# Patient Record
Sex: FEMALE | Race: WHITE | NOT HISPANIC OR LATINO | Employment: FULL TIME | URBAN - METROPOLITAN AREA
[De-identification: names, ages, dates, MRNs, and addresses within clinical notes are randomized per-mention and may not be internally consistent; named-entity substitution may affect disease eponyms.]

---

## 2023-03-07 LAB — THYROTROPIN (MIU/L) IN SER/PLAS BY DETECTION LIMIT <= 0.05 MIU/L: 0.16 MIU/L (ref 0.44–3.98)

## 2023-03-09 PROBLEM — D22.9 MULTIPLE NEVI: Status: ACTIVE | Noted: 2023-03-09

## 2023-03-09 PROBLEM — N89.8 VAGINAL IRRITATION: Status: ACTIVE | Noted: 2023-03-09

## 2023-03-09 PROBLEM — M25.562 LEFT KNEE PAIN: Status: ACTIVE | Noted: 2023-03-09

## 2023-03-09 PROBLEM — E03.9 HYPOTHYROIDISM: Status: ACTIVE | Noted: 2023-03-09

## 2023-03-09 PROBLEM — B37.31 YEAST VAGINITIS: Status: ACTIVE | Noted: 2023-03-09

## 2023-03-09 PROBLEM — J06.9 URI WITH COUGH AND CONGESTION: Status: ACTIVE | Noted: 2023-03-09

## 2023-03-09 PROBLEM — K90.0 CELIAC DISEASE (HHS-HCC): Status: ACTIVE | Noted: 2023-03-09

## 2023-03-09 PROBLEM — K58.0 IRRITABLE BOWEL SYNDROME WITH DIARRHEA: Status: ACTIVE | Noted: 2023-03-09

## 2023-03-09 PROBLEM — L98.9 FACIAL SKIN LESION: Status: ACTIVE | Noted: 2023-03-09

## 2023-03-09 PROBLEM — J02.9 SORE THROAT: Status: ACTIVE | Noted: 2023-03-09

## 2023-03-09 PROBLEM — R05.9 COUGH: Status: ACTIVE | Noted: 2023-03-09

## 2023-03-09 PROBLEM — R39.15 URINARY URGENCY: Status: ACTIVE | Noted: 2023-03-09

## 2023-03-09 PROBLEM — J02.9 ACUTE PHARYNGITIS: Status: ACTIVE | Noted: 2023-03-09

## 2023-03-09 PROBLEM — R50.9 FEVER: Status: ACTIVE | Noted: 2023-03-09

## 2023-03-09 PROBLEM — N39.0 ACUTE UTI: Status: ACTIVE | Noted: 2023-03-09

## 2023-03-09 PROBLEM — O02.1 MISSED ABORTION (HHS-HCC): Status: ACTIVE | Noted: 2023-03-09

## 2023-03-09 PROBLEM — R87.610 PAP SMEAR ABNORMALITY OF CERVIX WITH ASCUS FAVORING BENIGN: Status: ACTIVE | Noted: 2023-03-09

## 2023-03-09 PROBLEM — J01.90 ACUTE SINUSITIS: Status: ACTIVE | Noted: 2023-03-09

## 2023-03-09 PROBLEM — F98.8 ADD (ATTENTION DEFICIT DISORDER): Status: ACTIVE | Noted: 2023-03-09

## 2023-03-09 PROBLEM — Z20.822 SUSPECTED COVID-19 VIRUS INFECTION: Status: ACTIVE | Noted: 2023-03-09

## 2023-03-09 PROBLEM — M25.862: Status: ACTIVE | Noted: 2023-03-09

## 2023-03-09 RX ORDER — LEVOTHYROXINE SODIUM 112 UG/1
TABLET ORAL
COMMUNITY
Start: 2022-01-24 | End: 2023-03-22

## 2023-03-12 ASSESSMENT — PROMIS GLOBAL HEALTH SCALE
RATE_GENERAL_HEALTH: VERY GOOD
RATE_AVERAGE_PAIN: 0
RATE_SOCIAL_SATISFACTION: EXCELLENT
EMOTIONAL_PROBLEMS: RARELY
RATE_MENTAL_HEALTH: VERY GOOD
CARRYOUT_SOCIAL_ACTIVITIES: EXCELLENT
RATE_PHYSICAL_HEALTH: EXCELLENT
CARRYOUT_PHYSICAL_ACTIVITIES: COMPLETELY
RATE_AVERAGE_FATIGUE: MILD
RATE_QUALITY_OF_LIFE: VERY GOOD

## 2023-03-13 ENCOUNTER — OFFICE VISIT (OUTPATIENT)
Dept: PRIMARY CARE | Facility: CLINIC | Age: 38
End: 2023-03-13
Payer: COMMERCIAL

## 2023-03-13 ENCOUNTER — APPOINTMENT (OUTPATIENT)
Dept: PRIMARY CARE | Facility: CLINIC | Age: 38
End: 2023-03-13
Payer: COMMERCIAL

## 2023-03-13 VITALS
WEIGHT: 117.1 LBS | HEART RATE: 87 BPM | OXYGEN SATURATION: 98 % | HEIGHT: 64 IN | DIASTOLIC BLOOD PRESSURE: 78 MMHG | BODY MASS INDEX: 19.99 KG/M2 | TEMPERATURE: 98.6 F | RESPIRATION RATE: 15 BRPM | SYSTOLIC BLOOD PRESSURE: 127 MMHG

## 2023-03-13 DIAGNOSIS — K90.0 CELIAC DISEASE (HHS-HCC): Primary | ICD-10-CM

## 2023-03-13 DIAGNOSIS — E06.3 HYPOTHYROIDISM DUE TO HASHIMOTO'S THYROIDITIS: ICD-10-CM

## 2023-03-13 DIAGNOSIS — E03.8 HYPOTHYROIDISM DUE TO HASHIMOTO'S THYROIDITIS: ICD-10-CM

## 2023-03-13 PROCEDURE — 99204 OFFICE O/P NEW MOD 45 MIN: CPT | Performed by: STUDENT IN AN ORGANIZED HEALTH CARE EDUCATION/TRAINING PROGRAM

## 2023-03-13 NOTE — PROGRESS NOTES
"Subjective   Patient ID: Marisa Hopkins is a 37 y.o. female who presents for [facility]  HPI  Marisa is here to establish care.  She has known hypothyroidism on Synthroid.  Reports she has also been diagnosed to have celiac disease [when she.  Varying level of TSH that required different dosages of Synthroid] reports that she had a miscarriage in June and when she tested her TSH in March-reportedly 0.16 TSH  Today she denies any palpitations or tremors.      P rest of her medical history is as follows Hypothyroid since 1998     Prenatal   Celiac disease - just gluten free diet   Social drinks alcohol  No smoking   No recreational drugs  Family h/o : mom : hypothyroid  Cad: grandfathers       Review of Systems   Constitutional:  Negative for activity change and fever.   HENT:  Negative for congestion.    Respiratory:  Negative for cough, shortness of breath and wheezing.    Cardiovascular:  Negative for chest pain and leg swelling.   Gastrointestinal:  Negative for abdominal pain, constipation, nausea and vomiting.   Endocrine: Negative for cold intolerance.   Genitourinary:  Negative for dysuria, hematuria and urgency.   Neurological:  Negative for dizziness, speech difficulty, weakness and numbness.   Psychiatric/Behavioral:  Negative for self-injury and suicidal ideas.        Objective   Visit Vitals  /78   Pulse 87   Temp 37 °C (98.6 °F)   Resp 15   Ht 1.626 m (5' 4\")   Wt 53.1 kg (117 lb 1.6 oz)   SpO2 98%   BMI 20.10 kg/m²   Smoking Status Never   BSA 1.55 m²      Physical Exam  HENT:      Head: Normocephalic and atraumatic.      Nose: Nose normal.      Mouth/Throat:      Mouth: Mucous membranes are moist.   Eyes:      Extraocular Movements: Extraocular movements intact.      Conjunctiva/sclera: Conjunctivae normal.      Pupils: Pupils are equal, round, and reactive to light.   Cardiovascular:      Rate and Rhythm: Normal rate and regular rhythm.      Pulses: Normal pulses.      Heart sounds: Normal heart " sounds.   Pulmonary:      Effort: Pulmonary effort is normal.      Breath sounds: Normal breath sounds. No stridor. No rhonchi.   Abdominal:      General: Bowel sounds are normal.      Palpations: Abdomen is soft.      Tenderness: There is no abdominal tenderness. There is no guarding or rebound.   Musculoskeletal:      Cervical back: Neck supple.   Neurological:      Mental Status: She is oriented to person, place, and time.   Psychiatric:         Mood and Affect: Mood normal.         Behavior: Behavior normal.         Assessment/Plan     Problem List Items Addressed This Visit          Digestive    Celiac disease - Primary    Relevant Orders    TSH (Completed)    Comprehensive Metabolic Panel (Completed)    Lipid Panel (Completed)    CBC (Completed)       Endocrine/Metabolic    Hypothyroidism    Relevant Orders    TSH (Completed)    Comprehensive Metabolic Panel (Completed)    Lipid Panel (Completed)    CBC (Completed)   Overall patient has hypothyroidism and celiac disease and also takes multivitamins.  Advised patient to retest TSH after she has stopped her multivitamins for at least 2 to 3 days.  We will call the patient with abnormal labs if any and discuss necessary changes based on labs; otherwise patient to follow up in 3 reynold for hypothyroidism and in 1 year for next physical exam

## 2023-03-16 ENCOUNTER — LAB (OUTPATIENT)
Dept: LAB | Facility: LAB | Age: 38
End: 2023-03-16
Payer: COMMERCIAL

## 2023-03-16 DIAGNOSIS — E03.8 HYPOTHYROIDISM DUE TO HASHIMOTO'S THYROIDITIS: ICD-10-CM

## 2023-03-16 DIAGNOSIS — K90.0 CELIAC DISEASE (HHS-HCC): ICD-10-CM

## 2023-03-16 DIAGNOSIS — E06.3 HYPOTHYROIDISM DUE TO HASHIMOTO'S THYROIDITIS: ICD-10-CM

## 2023-03-16 PROCEDURE — 85027 COMPLETE CBC AUTOMATED: CPT

## 2023-03-16 PROCEDURE — 84443 ASSAY THYROID STIM HORMONE: CPT

## 2023-03-16 PROCEDURE — 80061 LIPID PANEL: CPT

## 2023-03-16 PROCEDURE — 36415 COLL VENOUS BLD VENIPUNCTURE: CPT

## 2023-03-16 PROCEDURE — 80053 COMPREHEN METABOLIC PANEL: CPT

## 2023-03-17 LAB
ALANINE AMINOTRANSFERASE (SGPT) (U/L) IN SER/PLAS: 11 U/L (ref 7–45)
ALBUMIN (G/DL) IN SER/PLAS: 4.2 G/DL (ref 3.4–5)
ALKALINE PHOSPHATASE (U/L) IN SER/PLAS: 34 U/L (ref 33–110)
ANION GAP IN SER/PLAS: 13 MMOL/L (ref 10–20)
ASPARTATE AMINOTRANSFERASE (SGOT) (U/L) IN SER/PLAS: 14 U/L (ref 9–39)
BILIRUBIN TOTAL (MG/DL) IN SER/PLAS: 0.5 MG/DL (ref 0–1.2)
CALCIUM (MG/DL) IN SER/PLAS: 10 MG/DL (ref 8.6–10.6)
CARBON DIOXIDE, TOTAL (MMOL/L) IN SER/PLAS: 29 MMOL/L (ref 21–32)
CHLORIDE (MMOL/L) IN SER/PLAS: 103 MMOL/L (ref 98–107)
CHOLESTEROL (MG/DL) IN SER/PLAS: 169 MG/DL (ref 0–199)
CHOLESTEROL IN HDL (MG/DL) IN SER/PLAS: 70.3 MG/DL
CHOLESTEROL/HDL RATIO: 2.4
CREATININE (MG/DL) IN SER/PLAS: 0.76 MG/DL (ref 0.5–1.05)
ERYTHROCYTE DISTRIBUTION WIDTH (RATIO) BY AUTOMATED COUNT: 11.9 % (ref 11.5–14.5)
ERYTHROCYTE MEAN CORPUSCULAR HEMOGLOBIN CONCENTRATION (G/DL) BY AUTOMATED: 32.1 G/DL (ref 32–36)
ERYTHROCYTE MEAN CORPUSCULAR VOLUME (FL) BY AUTOMATED COUNT: 97 FL (ref 80–100)
ERYTHROCYTES (10*6/UL) IN BLOOD BY AUTOMATED COUNT: 4.15 X10E12/L (ref 4–5.2)
GFR FEMALE: >90 ML/MIN/1.73M2
GLUCOSE (MG/DL) IN SER/PLAS: 87 MG/DL (ref 74–99)
HEMATOCRIT (%) IN BLOOD BY AUTOMATED COUNT: 40.2 % (ref 36–46)
HEMOGLOBIN (G/DL) IN BLOOD: 12.9 G/DL (ref 12–16)
LDL: 88 MG/DL (ref 0–99)
LEUKOCYTES (10*3/UL) IN BLOOD BY AUTOMATED COUNT: 4.3 X10E9/L (ref 4.4–11.3)
NRBC (PER 100 WBCS) BY AUTOMATED COUNT: 0 /100 WBC (ref 0–0)
PLATELETS (10*3/UL) IN BLOOD AUTOMATED COUNT: 187 X10E9/L (ref 150–450)
POTASSIUM (MMOL/L) IN SER/PLAS: 4.1 MMOL/L (ref 3.5–5.3)
PROTEIN TOTAL: 7.1 G/DL (ref 6.4–8.2)
SODIUM (MMOL/L) IN SER/PLAS: 141 MMOL/L (ref 136–145)
THYROTROPIN (MIU/L) IN SER/PLAS BY DETECTION LIMIT <= 0.05 MIU/L: 0.24 MIU/L (ref 0.44–3.98)
TRIGLYCERIDE (MG/DL) IN SER/PLAS: 54 MG/DL (ref 0–149)
UREA NITROGEN (MG/DL) IN SER/PLAS: 12 MG/DL (ref 6–23)
VLDL: 11 MG/DL (ref 0–40)

## 2023-03-22 ENCOUNTER — TELEPHONE (OUTPATIENT)
Dept: PRIMARY CARE | Facility: CLINIC | Age: 38
End: 2023-03-22
Payer: COMMERCIAL

## 2023-03-22 DIAGNOSIS — E06.3 HYPOTHYROIDISM DUE TO HASHIMOTO'S THYROIDITIS: ICD-10-CM

## 2023-03-22 DIAGNOSIS — E06.3 HYPOTHYROIDISM DUE TO HASHIMOTO'S THYROIDITIS: Primary | ICD-10-CM

## 2023-03-22 DIAGNOSIS — E03.8 HYPOTHYROIDISM DUE TO HASHIMOTO'S THYROIDITIS: Primary | ICD-10-CM

## 2023-03-22 DIAGNOSIS — E03.8 HYPOTHYROIDISM DUE TO HASHIMOTO'S THYROIDITIS: ICD-10-CM

## 2023-03-22 RX ORDER — LEVOTHYROXINE SODIUM 88 UG/1
88 TABLET ORAL DAILY
Qty: 30 TABLET | Refills: 2 | Status: SHIPPED | OUTPATIENT
Start: 2023-03-22 | End: 2023-03-24 | Stop reason: SDUPTHER

## 2023-03-24 RX ORDER — LEVOTHYROXINE SODIUM 88 UG/1
88 TABLET ORAL DAILY
Qty: 30 TABLET | Refills: 2 | Status: SHIPPED | OUTPATIENT
Start: 2023-03-24 | End: 2023-03-28

## 2023-03-25 ASSESSMENT — ENCOUNTER SYMPTOMS
COUGH: 0
HEMATURIA: 0
DIZZINESS: 0
FEVER: 0
NAUSEA: 0
SHORTNESS OF BREATH: 0
CONSTIPATION: 0
DYSURIA: 0
ABDOMINAL PAIN: 0
NUMBNESS: 0
ACTIVITY CHANGE: 0
WHEEZING: 0
WEAKNESS: 0
SPEECH DIFFICULTY: 0
VOMITING: 0

## 2023-03-25 NOTE — TELEPHONE ENCOUNTER
Rx Refill Request Telephone Encounter    Name:  Marisa DAMON Casnancy  :  [id number]  Medication Name:  Synthroid  Sent per request

## 2023-03-28 RX ORDER — LEVOTHYROXINE SODIUM 88 UG/1
88 TABLET ORAL DAILY
Qty: 30 TABLET | Refills: 2 | Status: SHIPPED | OUTPATIENT
Start: 2023-03-28 | End: 2023-04-21

## 2023-04-17 ENCOUNTER — TELEPHONE (OUTPATIENT)
Dept: PRIMARY CARE | Facility: CLINIC | Age: 38
End: 2023-04-17
Payer: COMMERCIAL

## 2023-04-17 DIAGNOSIS — E03.9 HYPOTHYROIDISM, UNSPECIFIED TYPE: Primary | ICD-10-CM

## 2023-04-17 NOTE — TELEPHONE ENCOUNTER
PT wants TSH bloodwork ordered says dr hammond wanted it to be rechecked this month due ton her being on meds

## 2023-04-20 ENCOUNTER — LAB (OUTPATIENT)
Dept: LAB | Facility: LAB | Age: 38
End: 2023-04-20
Payer: COMMERCIAL

## 2023-04-20 DIAGNOSIS — E03.9 HYPOTHYROIDISM, UNSPECIFIED TYPE: ICD-10-CM

## 2023-04-20 LAB — THYROTROPIN (MIU/L) IN SER/PLAS BY DETECTION LIMIT <= 0.05 MIU/L: 0.63 MIU/L (ref 0.44–3.98)

## 2023-04-20 PROCEDURE — 36415 COLL VENOUS BLD VENIPUNCTURE: CPT

## 2023-04-20 PROCEDURE — 84443 ASSAY THYROID STIM HORMONE: CPT

## 2023-05-23 DIAGNOSIS — E03.8 HYPOTHYROIDISM DUE TO HASHIMOTO'S THYROIDITIS: ICD-10-CM

## 2023-05-23 DIAGNOSIS — E06.3 HYPOTHYROIDISM DUE TO HASHIMOTO'S THYROIDITIS: ICD-10-CM

## 2023-05-25 DIAGNOSIS — E06.3 HYPOTHYROIDISM DUE TO HASHIMOTO'S THYROIDITIS: ICD-10-CM

## 2023-05-25 DIAGNOSIS — E03.8 HYPOTHYROIDISM DUE TO HASHIMOTO'S THYROIDITIS: ICD-10-CM

## 2023-05-25 RX ORDER — LEVOTHYROXINE SODIUM 88 UG/1
TABLET ORAL
Qty: 30 TABLET | Refills: 0 | Status: SHIPPED | OUTPATIENT
Start: 2023-05-25 | End: 2023-05-25 | Stop reason: SDUPTHER

## 2023-05-26 RX ORDER — LEVOTHYROXINE SODIUM 88 UG/1
88 TABLET ORAL DAILY
Qty: 90 TABLET | Refills: 0 | Status: SHIPPED | OUTPATIENT
Start: 2023-05-26 | End: 2023-06-19 | Stop reason: SDUPTHER

## 2023-06-15 ENCOUNTER — LAB (OUTPATIENT)
Dept: LAB | Facility: LAB | Age: 38
End: 2023-06-15
Payer: COMMERCIAL

## 2023-06-15 DIAGNOSIS — E03.9 HYPOTHYROIDISM, UNSPECIFIED TYPE: Primary | ICD-10-CM

## 2023-06-15 DIAGNOSIS — E03.9 HYPOTHYROIDISM, UNSPECIFIED TYPE: ICD-10-CM

## 2023-06-15 LAB
ALANINE AMINOTRANSFERASE (SGPT) (U/L) IN SER/PLAS: 15 U/L (ref 7–45)
ALBUMIN (G/DL) IN SER/PLAS: 4.8 G/DL (ref 3.4–5)
ALKALINE PHOSPHATASE (U/L) IN SER/PLAS: 41 U/L (ref 33–110)
ANION GAP IN SER/PLAS: 12 MMOL/L (ref 10–20)
ASPARTATE AMINOTRANSFERASE (SGOT) (U/L) IN SER/PLAS: 18 U/L (ref 9–39)
BILIRUBIN TOTAL (MG/DL) IN SER/PLAS: 0.7 MG/DL (ref 0–1.2)
CALCIUM (MG/DL) IN SER/PLAS: 10.3 MG/DL (ref 8.6–10.6)
CARBON DIOXIDE, TOTAL (MMOL/L) IN SER/PLAS: 29 MMOL/L (ref 21–32)
CHLORIDE (MMOL/L) IN SER/PLAS: 101 MMOL/L (ref 98–107)
CHOLESTEROL (MG/DL) IN SER/PLAS: 199 MG/DL (ref 0–199)
CHOLESTEROL IN HDL (MG/DL) IN SER/PLAS: 79.8 MG/DL
CHOLESTEROL/HDL RATIO: 2.5
CREATININE (MG/DL) IN SER/PLAS: 0.68 MG/DL (ref 0.5–1.05)
ERYTHROCYTE DISTRIBUTION WIDTH (RATIO) BY AUTOMATED COUNT: 12.1 % (ref 11.5–14.5)
ERYTHROCYTE MEAN CORPUSCULAR HEMOGLOBIN CONCENTRATION (G/DL) BY AUTOMATED: 32.5 G/DL (ref 32–36)
ERYTHROCYTE MEAN CORPUSCULAR VOLUME (FL) BY AUTOMATED COUNT: 95 FL (ref 80–100)
ERYTHROCYTES (10*6/UL) IN BLOOD BY AUTOMATED COUNT: 4.45 X10E12/L (ref 4–5.2)
GFR FEMALE: >90 ML/MIN/1.73M2
GLUCOSE (MG/DL) IN SER/PLAS: 85 MG/DL (ref 74–99)
HEMATOCRIT (%) IN BLOOD BY AUTOMATED COUNT: 42.2 % (ref 36–46)
HEMOGLOBIN (G/DL) IN BLOOD: 13.7 G/DL (ref 12–16)
LDL: 107 MG/DL (ref 0–99)
LEUKOCYTES (10*3/UL) IN BLOOD BY AUTOMATED COUNT: 6.7 X10E9/L (ref 4.4–11.3)
NRBC (PER 100 WBCS) BY AUTOMATED COUNT: 0 /100 WBC (ref 0–0)
PLATELETS (10*3/UL) IN BLOOD AUTOMATED COUNT: 166 X10E9/L (ref 150–450)
POTASSIUM (MMOL/L) IN SER/PLAS: 4.3 MMOL/L (ref 3.5–5.3)
PROTEIN TOTAL: 7.7 G/DL (ref 6.4–8.2)
SODIUM (MMOL/L) IN SER/PLAS: 138 MMOL/L (ref 136–145)
THYROTROPIN (MIU/L) IN SER/PLAS BY DETECTION LIMIT <= 0.05 MIU/L: 2.38 MIU/L (ref 0.44–3.98)
TRIGLYCERIDE (MG/DL) IN SER/PLAS: 62 MG/DL (ref 0–149)
UREA NITROGEN (MG/DL) IN SER/PLAS: 16 MG/DL (ref 6–23)
VLDL: 12 MG/DL (ref 0–40)

## 2023-06-15 PROCEDURE — 80053 COMPREHEN METABOLIC PANEL: CPT

## 2023-06-15 PROCEDURE — 80061 LIPID PANEL: CPT

## 2023-06-15 PROCEDURE — 36415 COLL VENOUS BLD VENIPUNCTURE: CPT

## 2023-06-15 PROCEDURE — 85027 COMPLETE CBC AUTOMATED: CPT

## 2023-06-15 PROCEDURE — 84443 ASSAY THYROID STIM HORMONE: CPT

## 2023-06-19 ENCOUNTER — OFFICE VISIT (OUTPATIENT)
Dept: PRIMARY CARE | Facility: CLINIC | Age: 38
End: 2023-06-19
Payer: COMMERCIAL

## 2023-06-19 DIAGNOSIS — E06.3 HYPOTHYROIDISM DUE TO HASHIMOTO'S THYROIDITIS: ICD-10-CM

## 2023-06-19 DIAGNOSIS — E03.8 HYPOTHYROIDISM DUE TO HASHIMOTO'S THYROIDITIS: ICD-10-CM

## 2023-06-19 DIAGNOSIS — K90.0 CELIAC DISEASE (HHS-HCC): Primary | ICD-10-CM

## 2023-06-19 PROCEDURE — 99214 OFFICE O/P EST MOD 30 MIN: CPT | Performed by: STUDENT IN AN ORGANIZED HEALTH CARE EDUCATION/TRAINING PROGRAM

## 2023-06-19 RX ORDER — LEVOTHYROXINE SODIUM 88 UG/1
88 TABLET ORAL DAILY
Qty: 90 TABLET | Refills: 1 | Status: SHIPPED | OUTPATIENT
Start: 2023-06-19 | End: 2023-09-20 | Stop reason: SDUPTHER

## 2023-06-19 NOTE — PROGRESS NOTES
Subjective   Patient ID: Marisa Hopkins is a 37 y.o. female who presents for follow-up.  HPI  Marisa is 37 years old with known hypothyroidism here for a follow-up.  No concerns she also has a history of celiac disease.  Past Medical History:   Diagnosis Date    Celiac disease     Celiac disease    Contact with and (suspected) exposure to infections with a predominantly sexual mode of transmission 01/10/2019    STD exposure    Encounter for removal and reinsertion of intrauterine contraceptive device 06/16/2021    Encounter for IUD removal and reinsertion    Encounter for routine checking of intrauterine contraceptive device 11/29/2021    IUD check up    Personal history of other endocrine, nutritional and metabolic disease     History of hypothyroidism      Past Surgical History:   Procedure Laterality Date    CERVICAL BIOPSY  W/ LOOP ELECTRODE EXCISION  03/10/2014    Cervical Loop Electrosurgical Excision (LEEP)      Family History   Problem Relation Name Age of Onset    Hypothyroidism Mother      Celiac disease Sister          microscopic colitis    Breast cancer Father's Sister      Thyroid disease Other        No Known Allergies       Occupation:     Review of Systems   Constitutional:  Negative for activity change and fever.   HENT:  Negative for congestion.    Respiratory:  Negative for cough, shortness of breath and wheezing.    Cardiovascular:  Negative for chest pain and leg swelling.   Gastrointestinal:  Negative for abdominal pain, constipation, nausea and vomiting.   Endocrine: Negative for cold intolerance.   Genitourinary:  Negative for dysuria, hematuria and urgency.   Neurological:  Negative for dizziness, speech difficulty, weakness and numbness.   Psychiatric/Behavioral:  Negative for self-injury and suicidal ideas.        Objective   Visit Vitals  OB Status Unknown   Smoking Status Never      Physical Exam  Constitutional:       Appearance: Normal appearance.   HENT:      Head: Normocephalic and  atraumatic.      Nose: Nose normal.      Mouth/Throat:      Mouth: Mucous membranes are moist.   Eyes:      Conjunctiva/sclera: Conjunctivae normal.      Pupils: Pupils are equal, round, and reactive to light.   Cardiovascular:      Rate and Rhythm: Normal rate and regular rhythm.      Pulses: Normal pulses.      Heart sounds: Normal heart sounds.   Pulmonary:      Effort: Pulmonary effort is normal.      Breath sounds: Normal breath sounds.   Musculoskeletal:         General: Normal range of motion.      Cervical back: Neck supple.   Skin:     General: Skin is warm.   Neurological:      General: No focal deficit present.      Mental Status: She is alert and oriented to person, place, and time.   Psychiatric:         Mood and Affect: Mood normal.         Behavior: Behavior normal.         Thought Content: Thought content normal.         Judgment: Judgment normal.         Assessment/Plan     Problem List Items Addressed This Visit       Celiac disease - Primary    Hypothyroidism    Relevant Medications    Synthroid 88 mcg tablet    Other Relevant Orders    TSH   Marisa is 37 years old with known celiac disease and hypothyroidism on thyroid medication.  Her celiac disease is currently being monitored with diet modification.  Bone density scan and vaccinations especially pneumococcal vaccine discussed with patient.  Wants to think about it.  As per her thyroid medication, we do see a slight uptrend in the TSH.  We will continue to monitor TSH.  If the trend continues upward I patient has reproductive issue, agreeable to increase the dose or see endocrinology.  For celiac disease, patient will continue gluten-free diet and call us if she has any worsening of symptoms.

## 2023-07-05 ASSESSMENT — ENCOUNTER SYMPTOMS
ABDOMINAL PAIN: 0
WEAKNESS: 0
CONSTIPATION: 0
FEVER: 0
COUGH: 0
SHORTNESS OF BREATH: 0
DYSURIA: 0
HEMATURIA: 0
WHEEZING: 0
NAUSEA: 0
VOMITING: 0
ACTIVITY CHANGE: 0
NUMBNESS: 0
DIZZINESS: 0
SPEECH DIFFICULTY: 0

## 2023-09-19 ENCOUNTER — LAB (OUTPATIENT)
Dept: LAB | Facility: LAB | Age: 38
End: 2023-09-19
Payer: COMMERCIAL

## 2023-09-19 DIAGNOSIS — E03.8 HYPOTHYROIDISM DUE TO HASHIMOTO'S THYROIDITIS: ICD-10-CM

## 2023-09-19 DIAGNOSIS — E06.3 HYPOTHYROIDISM DUE TO HASHIMOTO'S THYROIDITIS: ICD-10-CM

## 2023-09-19 LAB — THYROTROPIN (MIU/L) IN SER/PLAS BY DETECTION LIMIT <= 0.05 MIU/L: 1.68 MIU/L (ref 0.44–3.98)

## 2023-09-19 PROCEDURE — 36415 COLL VENOUS BLD VENIPUNCTURE: CPT

## 2023-09-19 PROCEDURE — 84443 ASSAY THYROID STIM HORMONE: CPT

## 2023-09-20 DIAGNOSIS — E03.8 HYPOTHYROIDISM DUE TO HASHIMOTO'S THYROIDITIS: ICD-10-CM

## 2023-09-20 DIAGNOSIS — E06.3 HYPOTHYROIDISM DUE TO HASHIMOTO'S THYROIDITIS: ICD-10-CM

## 2023-09-20 RX ORDER — LEVOTHYROXINE SODIUM 88 UG/1
88 TABLET ORAL DAILY
Qty: 90 TABLET | Refills: 1 | Status: SHIPPED | OUTPATIENT
Start: 2023-09-20 | End: 2023-12-07 | Stop reason: ALTCHOICE

## 2023-10-27 ENCOUNTER — TELEPHONE (OUTPATIENT)
Dept: OBSTETRICS AND GYNECOLOGY | Facility: CLINIC | Age: 38
End: 2023-10-27
Payer: COMMERCIAL

## 2023-10-27 DIAGNOSIS — E07.9 THYROID DISORDER: Primary | ICD-10-CM

## 2023-11-01 NOTE — TELEPHONE ENCOUNTER
Pt has h/o thyroid disorder and recently had positive pregnancy test.  Will place order for TSH draw.

## 2023-11-30 ENCOUNTER — APPOINTMENT (OUTPATIENT)
Dept: OBSTETRICS AND GYNECOLOGY | Facility: CLINIC | Age: 38
End: 2023-11-30
Payer: COMMERCIAL

## 2023-12-01 DIAGNOSIS — E06.3 HYPOTHYROIDISM DUE TO HASHIMOTO'S THYROIDITIS: Primary | ICD-10-CM

## 2023-12-01 DIAGNOSIS — E03.8 HYPOTHYROIDISM DUE TO HASHIMOTO'S THYROIDITIS: Primary | ICD-10-CM

## 2023-12-04 DIAGNOSIS — E03.8 HYPOTHYROIDISM DUE TO HASHIMOTO'S THYROIDITIS: Primary | ICD-10-CM

## 2023-12-04 DIAGNOSIS — Z32.01 POSITIVE PREGNANCY TEST (HHS-HCC): ICD-10-CM

## 2023-12-04 DIAGNOSIS — E06.3 HYPOTHYROIDISM DUE TO HASHIMOTO'S THYROIDITIS: Primary | ICD-10-CM

## 2023-12-07 ENCOUNTER — LAB (OUTPATIENT)
Dept: LAB | Facility: LAB | Age: 38
End: 2023-12-07
Payer: COMMERCIAL

## 2023-12-07 DIAGNOSIS — E06.3 HYPOTHYROIDISM DUE TO HASHIMOTO'S THYROIDITIS: Primary | ICD-10-CM

## 2023-12-07 DIAGNOSIS — E06.3 HYPOTHYROIDISM DUE TO HASHIMOTO'S THYROIDITIS: ICD-10-CM

## 2023-12-07 DIAGNOSIS — E03.8 HYPOTHYROIDISM DUE TO HASHIMOTO'S THYROIDITIS: Primary | ICD-10-CM

## 2023-12-07 DIAGNOSIS — E03.8 HYPOTHYROIDISM DUE TO HASHIMOTO'S THYROIDITIS: ICD-10-CM

## 2023-12-07 LAB
T4 FREE SERPL-MCNC: 1.3 NG/DL (ref 0.9–1.7)
TSH SERPL DL<=0.05 MIU/L-ACNC: 4.77 MIU/L (ref 0.27–4.2)

## 2023-12-07 PROCEDURE — 36415 COLL VENOUS BLD VENIPUNCTURE: CPT

## 2023-12-07 PROCEDURE — 84439 ASSAY OF FREE THYROXINE: CPT

## 2023-12-07 PROCEDURE — 84443 ASSAY THYROID STIM HORMONE: CPT

## 2023-12-07 RX ORDER — LEVOTHYROXINE SODIUM 112 UG/1
112 TABLET ORAL DAILY
Qty: 30 TABLET | Refills: 1 | Status: SHIPPED | OUTPATIENT
Start: 2023-12-07 | End: 2024-02-07 | Stop reason: WASHOUT

## 2023-12-07 NOTE — PROGRESS NOTES
Patient reports she has a positive pregnancy test and is currently 9 weeks pregnant.  Last TSH was 4.77.  Advised her to follow-up with her endocrinologist but reports her endocrinology appointment is not until February.  We discussed on increased thyroid requirements during pregnancy.  We agreed on increasing thyroid medication to 112 mcg 7 times a day.  To measure TSH in 4 weeks.  To follow-up with gynecologist.  Patient verbalizes understanding and agreeable to plan

## 2024-01-02 ENCOUNTER — DOCUMENTATION (OUTPATIENT)
Dept: OBSTETRICS AND GYNECOLOGY | Facility: HOSPITAL | Age: 39
End: 2024-01-02
Payer: COMMERCIAL

## 2024-01-02 DIAGNOSIS — Z34.90 ENCOUNTER FOR PREGNANCY RELATED EXAMINATION, ANTEPARTUM (HHS-HCC): ICD-10-CM

## 2024-01-02 DIAGNOSIS — O09.519 ADVANCED MATERNAL AGE, PRIMIGRAVIDA, ANTEPARTUM (HHS-HCC): Primary | ICD-10-CM

## 2024-01-02 DIAGNOSIS — O99.280 HYPOTHYROIDISM IN PREGNANCY, ANTEPARTUM (HHS-HCC): ICD-10-CM

## 2024-01-02 DIAGNOSIS — E03.9 HYPOTHYROIDISM IN PREGNANCY, ANTEPARTUM (HHS-HCC): ICD-10-CM

## 2024-01-02 PROBLEM — D22.9 MULTIPLE NEVI: Status: RESOLVED | Noted: 2023-03-09 | Resolved: 2024-01-02

## 2024-01-02 PROBLEM — L98.9 FACIAL SKIN LESION: Status: RESOLVED | Noted: 2023-03-09 | Resolved: 2024-01-02

## 2024-01-02 PROBLEM — J02.9 ACUTE PHARYNGITIS: Status: RESOLVED | Noted: 2023-03-09 | Resolved: 2024-01-02

## 2024-01-02 PROBLEM — J01.90 ACUTE SINUSITIS: Status: RESOLVED | Noted: 2023-03-09 | Resolved: 2024-01-02

## 2024-01-02 PROBLEM — M25.862: Status: RESOLVED | Noted: 2023-03-09 | Resolved: 2024-01-02

## 2024-01-02 PROBLEM — J02.9 SORE THROAT: Status: RESOLVED | Noted: 2023-03-09 | Resolved: 2024-01-02

## 2024-01-02 PROBLEM — R50.9 FEVER: Status: RESOLVED | Noted: 2023-03-09 | Resolved: 2024-01-02

## 2024-01-02 PROBLEM — N39.0 ACUTE UTI: Status: RESOLVED | Noted: 2023-03-09 | Resolved: 2024-01-02

## 2024-01-02 PROBLEM — R05.9 COUGH: Status: RESOLVED | Noted: 2023-03-09 | Resolved: 2024-01-02

## 2024-01-02 PROBLEM — R39.15 URINARY URGENCY: Status: RESOLVED | Noted: 2023-03-09 | Resolved: 2024-01-02

## 2024-01-02 PROBLEM — R87.610 PAP SMEAR ABNORMALITY OF CERVIX WITH ASCUS FAVORING BENIGN: Status: RESOLVED | Noted: 2023-03-09 | Resolved: 2024-01-02

## 2024-01-02 PROBLEM — Z20.822 SUSPECTED COVID-19 VIRUS INFECTION: Status: RESOLVED | Noted: 2023-03-09 | Resolved: 2024-01-02

## 2024-01-02 PROBLEM — J06.9 URI WITH COUGH AND CONGESTION: Status: RESOLVED | Noted: 2023-03-09 | Resolved: 2024-01-02

## 2024-01-02 PROBLEM — N89.8 VAGINAL IRRITATION: Status: RESOLVED | Noted: 2023-03-09 | Resolved: 2024-01-02

## 2024-01-02 PROBLEM — M25.562 LEFT KNEE PAIN: Status: RESOLVED | Noted: 2023-03-09 | Resolved: 2024-01-02

## 2024-01-02 PROBLEM — B37.31 YEAST VAGINITIS: Status: RESOLVED | Noted: 2023-03-09 | Resolved: 2024-01-02

## 2024-01-02 PROBLEM — O02.1 MISSED ABORTION (HHS-HCC): Status: RESOLVED | Noted: 2023-03-09 | Resolved: 2024-01-02

## 2024-01-02 NOTE — PROGRESS NOTES
Subjective   Patient ID [id number]   Marisa Hopkins is a 38 y.o. No obstetric history on file. at Unknown with a working estimated date of delivery of Not found. who presents for an initial prenatal visit. This pregnancy is {pregnancy; planned/unplanned:89272}.    Her pregnancy is complicated by:  AMA    OB History   No obstetric history on file.          Objective   Physical Exam     Expected Total Weight Gain: Could not be calculated   Pregravid BMI: Could not be calculated             OBGyn Exam    Prenatal Labs  ***    Assessment/Plan   {Assess/Plan SmartLinks (Optional):80644}    Immunizations: ***  Prenatal Labs ordered  Daily prenatal vitamins prescribed  First trimester screening and second trimester screening discussed. Patient decided to ***  Follow up in 4 weeks for return OB visit.

## 2024-01-03 ENCOUNTER — APPOINTMENT (OUTPATIENT)
Dept: OBSTETRICS AND GYNECOLOGY | Facility: CLINIC | Age: 39
End: 2024-01-03
Payer: COMMERCIAL

## 2024-01-04 ENCOUNTER — INITIAL PRENATAL (OUTPATIENT)
Dept: OBSTETRICS AND GYNECOLOGY | Facility: CLINIC | Age: 39
End: 2024-01-04
Payer: COMMERCIAL

## 2024-01-04 ENCOUNTER — LAB (OUTPATIENT)
Dept: LAB | Facility: LAB | Age: 39
End: 2024-01-04
Payer: COMMERCIAL

## 2024-01-04 VITALS — SYSTOLIC BLOOD PRESSURE: 118 MMHG | WEIGHT: 120 LBS | DIASTOLIC BLOOD PRESSURE: 76 MMHG | BODY MASS INDEX: 20.6 KG/M2

## 2024-01-04 DIAGNOSIS — Z34.90 ENCOUNTER FOR PREGNANCY RELATED EXAMINATION, ANTEPARTUM (HHS-HCC): Primary | ICD-10-CM

## 2024-01-04 DIAGNOSIS — O99.280 HYPOTHYROIDISM IN PREGNANCY, ANTEPARTUM (HHS-HCC): ICD-10-CM

## 2024-01-04 DIAGNOSIS — E03.9 HYPOTHYROIDISM IN PREGNANCY, ANTEPARTUM (HHS-HCC): ICD-10-CM

## 2024-01-04 DIAGNOSIS — Z34.90 ENCOUNTER FOR PREGNANCY RELATED EXAMINATION, ANTEPARTUM (HHS-HCC): ICD-10-CM

## 2024-01-04 DIAGNOSIS — O09.519 ADVANCED MATERNAL AGE, PRIMIGRAVIDA, ANTEPARTUM (HHS-HCC): ICD-10-CM

## 2024-01-04 LAB
ABO GROUP (TYPE) IN BLOOD: NORMAL
ANTIBODY SCREEN: NORMAL
ERYTHROCYTE [DISTWIDTH] IN BLOOD BY AUTOMATED COUNT: 12.1 % (ref 11.5–14.5)
EST. AVERAGE GLUCOSE BLD GHB EST-MCNC: 91 MG/DL
HBA1C MFR BLD: 4.8 %
HCT VFR BLD AUTO: 39.7 % (ref 36–46)
HEMOGLOBIN A2: 2.6 % (ref 2–3.5)
HEMOGLOBIN A: 95.8 % (ref 95.8–98)
HEMOGLOBIN F: 1.6 % (ref 0–2)
HEMOGLOBIN IDENTIFICATION INTERPRETATION: NORMAL
HGB BLD-MCNC: 13 G/DL (ref 12–16)
MCH RBC QN AUTO: 31.3 PG (ref 26–34)
MCHC RBC AUTO-ENTMCNC: 32.7 G/DL (ref 32–36)
MCV RBC AUTO: 95 FL (ref 80–100)
NRBC BLD-RTO: 0 /100 WBCS (ref 0–0)
PATH REVIEW-HGB IDENTIFICATION: NORMAL
PLATELET # BLD AUTO: 184 X10*3/UL (ref 150–450)
RBC # BLD AUTO: 4.16 X10*6/UL (ref 4–5.2)
REFLEX ADDED, ANEMIA PANEL: NORMAL
RH FACTOR (ANTIGEN D): NORMAL
RUBV IGG SERPL IA-ACNC: 3.2 IA
RUBV IGG SERPL QL IA: POSITIVE
T4 FREE SERPL-MCNC: 0.97 NG/DL (ref 0.78–1.48)
TSH SERPL-ACNC: 20.98 MIU/L (ref 0.44–3.98)
WBC # BLD AUTO: 8.5 X10*3/UL (ref 4.4–11.3)

## 2024-01-04 PROCEDURE — 99204 OFFICE O/P NEW MOD 45 MIN: CPT | Performed by: OBSTETRICS & GYNECOLOGY

## 2024-01-04 PROCEDURE — 85027 COMPLETE CBC AUTOMATED: CPT

## 2024-01-04 PROCEDURE — 86850 RBC ANTIBODY SCREEN: CPT

## 2024-01-04 PROCEDURE — 86317 IMMUNOASSAY INFECTIOUS AGENT: CPT

## 2024-01-04 PROCEDURE — 88175 CYTOPATH C/V AUTO FLUID REDO: CPT

## 2024-01-04 PROCEDURE — 83036 HEMOGLOBIN GLYCOSYLATED A1C: CPT

## 2024-01-04 PROCEDURE — 86901 BLOOD TYPING SEROLOGIC RH(D): CPT

## 2024-01-04 PROCEDURE — 84439 ASSAY OF FREE THYROXINE: CPT

## 2024-01-04 PROCEDURE — 83020 HEMOGLOBIN ELECTROPHORESIS: CPT | Performed by: OBSTETRICS & GYNECOLOGY

## 2024-01-04 PROCEDURE — 83021 HEMOGLOBIN CHROMOTOGRAPHY: CPT

## 2024-01-04 PROCEDURE — 87624 HPV HI-RISK TYP POOLED RSLT: CPT

## 2024-01-04 PROCEDURE — 0500F INITIAL PRENATAL CARE VISIT: CPT | Performed by: OBSTETRICS & GYNECOLOGY

## 2024-01-04 PROCEDURE — 36415 COLL VENOUS BLD VENIPUNCTURE: CPT

## 2024-01-04 PROCEDURE — 87800 DETECT AGNT MULT DNA DIREC: CPT

## 2024-01-04 PROCEDURE — 87661 TRICHOMONAS VAGINALIS AMPLIF: CPT

## 2024-01-04 PROCEDURE — 84443 ASSAY THYROID STIM HORMONE: CPT

## 2024-01-04 PROCEDURE — 87086 URINE CULTURE/COLONY COUNT: CPT

## 2024-01-04 PROCEDURE — 86900 BLOOD TYPING SEROLOGIC ABO: CPT

## 2024-01-04 ASSESSMENT — EDINBURGH POSTNATAL DEPRESSION SCALE (EPDS)
I HAVE BEEN SO UNHAPPY THAT I HAVE HAD DIFFICULTY SLEEPING: NOT AT ALL
THE THOUGHT OF HARMING MYSELF HAS OCCURRED TO ME: NEVER
I HAVE BEEN SO UNHAPPY THAT I HAVE BEEN CRYING: NO, NEVER
I HAVE FELT SCARED OR PANICKY FOR NO GOOD REASON: NO, NOT AT ALL
I HAVE BEEN ABLE TO LAUGH AND SEE THE FUNNY SIDE OF THINGS: AS MUCH AS I ALWAYS COULD
I HAVE BEEN ANXIOUS OR WORRIED FOR NO GOOD REASON: NO, NOT AT ALL
THINGS HAVE BEEN GETTING ON TOP OF ME: NO, I HAVE BEEN COPING AS WELL AS EVER
I HAVE LOOKED FORWARD WITH ENJOYMENT TO THINGS: AS MUCH AS I EVER DID
I HAVE BLAMED MYSELF UNNECESSARILY WHEN THINGS WENT WRONG: NO, NEVER
TOTAL SCORE: 0
I HAVE FELT SAD OR MISERABLE: NO, NOT AT ALL

## 2024-01-04 NOTE — PROGRESS NOTES
Subjective   Patient ID [id number]   Marisa Hopkins is a 38 y.o.  at 9w0d with a working estimated date of delivery of 2024, by Last Menstrual Period who presents for an initial prenatal visit. This pregnancy is planned.  Had negative carrier screen.      Her pregnancy is complicated by:  AMA - declines genetic screen  Hypothyroidism - TSH 4.77 first trimester, increased dose to 112mcg daily  H/o LEEP    OB History    Para Term  AB Living   3 0 0 0 2 0   SAB IAB Ectopic Multiple Live Births   2 0 0 0 0      # Outcome Date GA Lbr Coleman/2nd Weight Sex Delivery Anes PTL Lv   3 Current            2 SAB 23 4w0d       FD      Birth Comments: chemical pregnancy   1 SAB 01/10/23 6w0d       FD     Fieldon  Depression Scale Total: 0    Objective   Physical Exam  Weight: 54.4 kg (120 lb)  Expected Total Weight Gain: 11.5 kg (25 lb)-16 kg (35 lb)   Pregravid BMI: 19.73  BP: 118/76          OBGyn Exam    Prenatal Labs  Remaining prenatal labs ordered today    Assessment/Plan     Immunizations: Declines flu vaccine  Remaining prenatal Labs ordered  Taking prenatal vitamins  First trimester screening and second trimester screening discussed. Patient declined genetic   Oriented to practice  Follow up in 4 weeks for return OB visit.

## 2024-01-05 ENCOUNTER — TELEPHONE (OUTPATIENT)
Dept: OBSTETRICS AND GYNECOLOGY | Facility: CLINIC | Age: 39
End: 2024-01-05
Payer: COMMERCIAL

## 2024-01-05 DIAGNOSIS — E06.3 HYPOTHYROIDISM DUE TO HASHIMOTO'S THYROIDITIS: ICD-10-CM

## 2024-01-05 DIAGNOSIS — E03.8 HYPOTHYROIDISM DUE TO HASHIMOTO'S THYROIDITIS: ICD-10-CM

## 2024-01-05 LAB — BACTERIA UR CULT: NO GROWTH

## 2024-01-05 NOTE — TELEPHONE ENCOUNTER
contacted pt  name and  verified  Spoke with pt relayed message from Mervin below:    Renzo Jeffries MD  [facility] [address] Clinical Support Staff  Please notify patient that her TSH is significantly increased.  Her PCP recommended synthroid 112mcg daily and I think she said she wasn't taking this daily.  Dose requirements may increase by as much as 50 percent during pregnancy.  I recommend 125mcg daily and rechecking TSH in 4 weeks.  Rx sent.    pt verbalized understanding  no further questions or concerns at this time

## 2024-01-05 NOTE — TELEPHONE ENCOUNTER
----- Message from Renzo Jeffries MD sent at 1/5/2024  2:18 PM EST -----  Please notify patient that her TSH is significantly increased.  Her PCP recommended synthroid 112mcg daily and I think she said she wasn't taking this daily.  Dose requirements may increase by as much as 50 percent during pregnancy.  I recommend 125mcg daily and rechecking TSH in 4 weeks.  Rx sent.  ----- Message -----  From: Lab, Background User  Sent: 1/4/2024   1:59 PM EST  To: Renzo Jeffries MD

## 2024-01-06 LAB
C TRACH RRNA SPEC QL NAA+PROBE: NEGATIVE
N GONORRHOEA DNA SPEC QL PROBE+SIG AMP: NEGATIVE
T VAGINALIS RRNA SPEC QL NAA+PROBE: NEGATIVE

## 2024-01-09 RX ORDER — LEVOTHYROXINE SODIUM 125 UG/1
125 TABLET ORAL
Qty: 90 TABLET | Refills: 3 | Status: SHIPPED | OUTPATIENT
Start: 2024-01-09 | End: 2024-03-06

## 2024-01-24 ENCOUNTER — HOSPITAL ENCOUNTER (OUTPATIENT)
Dept: RADIOLOGY | Facility: CLINIC | Age: 39
Discharge: HOME | End: 2024-01-24
Payer: COMMERCIAL

## 2024-01-24 DIAGNOSIS — Z34.90 ENCOUNTER FOR PREGNANCY RELATED EXAMINATION, ANTEPARTUM (HHS-HCC): ICD-10-CM

## 2024-01-24 LAB
CYTOLOGY CMNT CVX/VAG CYTO-IMP: NORMAL
HPV HR 12 DNA GENITAL QL NAA+PROBE: NEGATIVE
HPV HR GENOTYPES PNL CVX NAA+PROBE: NEGATIVE
HPV16 DNA SPEC QL NAA+PROBE: NEGATIVE
HPV18 DNA SPEC QL NAA+PROBE: NEGATIVE
LAB AP HPV GENOTYPE QUESTION: YES
LAB AP HPV HR: NORMAL
LAB AP PAP ADDITIONAL TESTS: NORMAL
LABORATORY COMMENT REPORT: NORMAL
PATH REPORT.TOTAL CANCER: NORMAL

## 2024-01-24 PROCEDURE — 76801 OB US < 14 WKS SINGLE FETUS: CPT

## 2024-01-24 PROCEDURE — 76815 OB US LIMITED FETUS(S): CPT | Performed by: OBSTETRICS & GYNECOLOGY

## 2024-01-31 ENCOUNTER — DOCUMENTATION (OUTPATIENT)
Dept: OBSTETRICS AND GYNECOLOGY | Facility: HOSPITAL | Age: 39
End: 2024-01-31
Payer: COMMERCIAL

## 2024-02-06 ENCOUNTER — LAB (OUTPATIENT)
Dept: LAB | Facility: LAB | Age: 39
End: 2024-02-06
Payer: COMMERCIAL

## 2024-02-06 DIAGNOSIS — E06.3 HYPOTHYROIDISM DUE TO HASHIMOTO'S THYROIDITIS: ICD-10-CM

## 2024-02-06 DIAGNOSIS — E03.8 HYPOTHYROIDISM DUE TO HASHIMOTO'S THYROIDITIS: ICD-10-CM

## 2024-02-06 LAB
T4 FREE SERPL-MCNC: 1.2 NG/DL (ref 0.9–1.7)
TSH SERPL DL<=0.05 MIU/L-ACNC: 10.64 MIU/L (ref 0.27–4.2)

## 2024-02-06 PROCEDURE — 36415 COLL VENOUS BLD VENIPUNCTURE: CPT

## 2024-02-06 PROCEDURE — 84439 ASSAY OF FREE THYROXINE: CPT

## 2024-02-06 PROCEDURE — 84443 ASSAY THYROID STIM HORMONE: CPT

## 2024-02-06 NOTE — PROGRESS NOTES
Ob Follow-up    SUBJECTIVE    HPI: Marisa Hopkins is a 38 y.o.  at 13w6d here for RPNV.  She denies vaginal bleeding, leakage of fluid, decreased fetal movements, and contractions.     OBJECTIVE  Visit Vitals  LMP 2023 (Exact Date)   OB Status Pregnant   Smoking Status Never      See OB flowsheet    ASSESSMENT & PLAN    Marisa Hopkins is a 38 y.o.  at 13w5d here for the following concerns we addressed today:    Problem List Items Addressed This Visit          Ob-Gyn Problems    Advanced maternal age, primigravida, antepartum - Primary    Overview     Declines genetic screening         Encounter for pregnancy related examination, antepartum    Overview     Dating:   [x] Initial BMI: 19  [x] Prenatal Labs: Reviewed, missing several labs  [x] Genetic Screening:  Declines  [x] Baby ASA: Yes  [] Anatomy US:  [] 1hr GCT at 24-28wks:  [] Tdap (27-36wks):  [x] Flu Shot:  Declines  [] COVID vaccine:   [x] Rhogam (if Rh neg): A positive  [] GBS at 36 wks:  [] Breastfeeding  [] Postpartum Birth control method:   [] 39 weeks discussion of IOL vs. Expectant management:  [x] Mode of delivery:   planning unmedicated birth              Other    Hypothyroidism in pregnancy, antepartum    Overview     On Synthroid 112mcg  TSH 1st tri 4.77, repeat 20! - Increased synthroid to 125mcg, repeat TSH 10 Increased to 150mcg          2nd tri          3rd tri              No orders of the defined types were placed in this encounter.       RTC in 4 weeks      Renzo Jeffries MD

## 2024-02-07 ENCOUNTER — ROUTINE PRENATAL (OUTPATIENT)
Dept: OBSTETRICS AND GYNECOLOGY | Facility: CLINIC | Age: 39
End: 2024-02-07
Payer: COMMERCIAL

## 2024-02-07 VITALS — WEIGHT: 121 LBS | DIASTOLIC BLOOD PRESSURE: 76 MMHG | BODY MASS INDEX: 20.77 KG/M2 | SYSTOLIC BLOOD PRESSURE: 117 MMHG

## 2024-02-07 DIAGNOSIS — O99.280 HYPOTHYROIDISM IN PREGNANCY, ANTEPARTUM (HHS-HCC): ICD-10-CM

## 2024-02-07 DIAGNOSIS — E07.9 THYROID DISORDER: ICD-10-CM

## 2024-02-07 DIAGNOSIS — O09.519 ADVANCED MATERNAL AGE, PRIMIGRAVIDA, ANTEPARTUM (HHS-HCC): Primary | ICD-10-CM

## 2024-02-07 DIAGNOSIS — E03.9 HYPOTHYROIDISM IN PREGNANCY, ANTEPARTUM (HHS-HCC): ICD-10-CM

## 2024-02-07 DIAGNOSIS — Z34.90 ENCOUNTER FOR PREGNANCY RELATED EXAMINATION, ANTEPARTUM (HHS-HCC): ICD-10-CM

## 2024-02-07 LAB
HIV 1+2 AB+HIV1 P24 AG SERPL QL IA: NONREACTIVE
T4 FREE SERPL-MCNC: 1 NG/DL (ref 0.78–1.48)
TSH SERPL-ACNC: 12.84 MIU/L (ref 0.44–3.98)

## 2024-02-07 PROCEDURE — 86780 TREPONEMA PALLIDUM: CPT

## 2024-02-07 PROCEDURE — 84443 ASSAY THYROID STIM HORMONE: CPT

## 2024-02-07 PROCEDURE — 87340 HEPATITIS B SURFACE AG IA: CPT

## 2024-02-07 PROCEDURE — 0501F PRENATAL FLOW SHEET: CPT | Performed by: OBSTETRICS & GYNECOLOGY

## 2024-02-07 PROCEDURE — 86787 VARICELLA-ZOSTER ANTIBODY: CPT

## 2024-02-07 PROCEDURE — 36415 COLL VENOUS BLD VENIPUNCTURE: CPT

## 2024-02-07 PROCEDURE — 84439 ASSAY OF FREE THYROXINE: CPT

## 2024-02-07 PROCEDURE — 86317 IMMUNOASSAY INFECTIOUS AGENT: CPT

## 2024-02-07 PROCEDURE — 87389 HIV-1 AG W/HIV-1&-2 AB AG IA: CPT

## 2024-02-07 RX ORDER — LEVOTHYROXINE SODIUM 150 UG/1
150 TABLET ORAL
Qty: 30 TABLET | Refills: 11 | Status: SHIPPED | OUTPATIENT
Start: 2024-02-07 | End: 2024-03-06

## 2024-02-08 LAB
HBV SURFACE AG SERPL QL IA: NONREACTIVE
RUBV IGG SERPL IA-ACNC: 3.4 IA
RUBV IGG SERPL QL IA: POSITIVE
TREPONEMA PALLIDUM IGG+IGM AB [PRESENCE] IN SERUM OR PLASMA BY IMMUNOASSAY: NONREACTIVE
VARICELLA ZOSTER IGG INDEX: 1.3 IA
VZV IGG SER QL IA: POSITIVE

## 2024-02-13 ENCOUNTER — APPOINTMENT (OUTPATIENT)
Dept: ENDOCRINOLOGY | Facility: CLINIC | Age: 39
End: 2024-02-13
Payer: COMMERCIAL

## 2024-03-01 DIAGNOSIS — E03.9 HYPOTHYROIDISM IN PREGNANCY, ANTEPARTUM (HHS-HCC): Primary | ICD-10-CM

## 2024-03-01 DIAGNOSIS — O99.280 HYPOTHYROIDISM IN PREGNANCY, ANTEPARTUM (HHS-HCC): Primary | ICD-10-CM

## 2024-03-04 ENCOUNTER — LAB (OUTPATIENT)
Dept: LAB | Facility: LAB | Age: 39
End: 2024-03-04
Payer: COMMERCIAL

## 2024-03-04 DIAGNOSIS — E03.9 HYPOTHYROIDISM IN PREGNANCY, ANTEPARTUM (HHS-HCC): ICD-10-CM

## 2024-03-04 DIAGNOSIS — O99.280 HYPOTHYROIDISM IN PREGNANCY, ANTEPARTUM (HHS-HCC): ICD-10-CM

## 2024-03-04 LAB — TSH SERPL DL<=0.05 MIU/L-ACNC: 3.45 MIU/L (ref 0.27–4.2)

## 2024-03-04 PROCEDURE — 84443 ASSAY THYROID STIM HORMONE: CPT

## 2024-03-04 PROCEDURE — 36415 COLL VENOUS BLD VENIPUNCTURE: CPT

## 2024-03-06 ENCOUNTER — TELEPHONE (OUTPATIENT)
Dept: OBSTETRICS AND GYNECOLOGY | Facility: CLINIC | Age: 39
End: 2024-03-06
Payer: COMMERCIAL

## 2024-03-06 RX ORDER — LEVOTHYROXINE SODIUM 175 UG/1
175 TABLET ORAL
Qty: 30 TABLET | Refills: 2 | Status: SHIPPED | OUTPATIENT
Start: 2024-03-06 | End: 2024-04-01

## 2024-03-06 NOTE — TELEPHONE ENCOUNTER
Called patient to relay results.  Identified by name and .  Informed patient of results and of rx that has been sent to pharmacy.   Patient stated no questions or concerns at this time.    BALJEET Gomez RN      ----- Message from Renzo Jeffries MD sent at 3/6/2024  9:13 AM EST -----  Please let patient know that her thyroid levels are trending in the right direction.  I want to increase her synthroid to 175mcg daily and I sent this rx in.  ----- Message -----  From: Lab, Background User  Sent: 3/4/2024   5:58 PM EST  To: Renzo Jeffries MD

## 2024-03-12 NOTE — PROGRESS NOTES
Ob Follow-up    SUBJECTIVE    HPI: Marisa Hopkins is a 38 y.o.  at 18w6d here for RPNV.  She denies vaginal bleeding, leakage of fluid, decreased fetal movements, and contractions.     OBJECTIVE  Visit Vitals  /73   Wt 57.2 kg (126 lb)   LMP 2023 (Exact Date)   BMI 21.63 kg/m²   OB Status Pregnant   Smoking Status Never   BSA 1.61 m²      See OB flowsheet    ASSESSMENT & PLAN    Marisa Hopkins is a 38 y.o.  at 18w6d here for the following concerns we addressed today:    Problem List Items Addressed This Visit          Ob-Gyn Problems    Advanced maternal age, primigravida, antepartum - Primary    Overview     Declines genetic screening         Encounter for pregnancy related examination, antepartum    Overview     Dating:   [x] Initial BMI: 19  [x] Prenatal Labs: Reviewed, missing several labs  [x] Genetic Screening:  Declines  [x] Baby ASA: Yes  [] Anatomy US:  [] 1hr GCT at 24-28wks:  [] Tdap (27-36wks):  [x] Flu Shot:  Declines  [] COVID vaccine:   [x] Rhogam (if Rh neg): A positive  [] GBS at 36 wks:  [] Breastfeeding  [] Postpartum Birth control method:   [] 39 weeks discussion of IOL vs. Expectant management:  [x] Mode of delivery:   planning unmedicated birth              Other    Hypothyroidism in pregnancy, antepartum    Overview     On Synthroid 112mcg  TSH 1st tri 4.77, repeat 20! - Increased synthroid to 125mcg, repeat TSH 10 Increased to 150mcg          2nd tri 3.45, increase synthroid to 175mcg          3rd tri          Anatomy ultrasound next week  Would like to do the Fresh test  RTC in 4 weeks      Renzo Jeffries MD

## 2024-03-13 ENCOUNTER — ROUTINE PRENATAL (OUTPATIENT)
Dept: OBSTETRICS AND GYNECOLOGY | Facility: CLINIC | Age: 39
End: 2024-03-13
Payer: COMMERCIAL

## 2024-03-13 VITALS — BODY MASS INDEX: 21.63 KG/M2 | WEIGHT: 126 LBS | SYSTOLIC BLOOD PRESSURE: 112 MMHG | DIASTOLIC BLOOD PRESSURE: 73 MMHG

## 2024-03-13 DIAGNOSIS — O99.280 HYPOTHYROIDISM IN PREGNANCY, ANTEPARTUM (HHS-HCC): ICD-10-CM

## 2024-03-13 DIAGNOSIS — O09.519 ADVANCED MATERNAL AGE, PRIMIGRAVIDA, ANTEPARTUM (HHS-HCC): Primary | ICD-10-CM

## 2024-03-13 DIAGNOSIS — E03.9 HYPOTHYROIDISM IN PREGNANCY, ANTEPARTUM (HHS-HCC): ICD-10-CM

## 2024-03-13 DIAGNOSIS — Z34.90 ENCOUNTER FOR PREGNANCY RELATED EXAMINATION, ANTEPARTUM (HHS-HCC): ICD-10-CM

## 2024-03-13 PROCEDURE — 0501F PRENATAL FLOW SHEET: CPT | Performed by: OBSTETRICS & GYNECOLOGY

## 2024-03-13 RX ORDER — ASPIRIN 81 MG/1
81 TABLET ORAL DAILY
COMMUNITY

## 2024-03-20 ENCOUNTER — HOSPITAL ENCOUNTER (OUTPATIENT)
Dept: RADIOLOGY | Facility: CLINIC | Age: 39
Discharge: HOME | End: 2024-03-20
Payer: COMMERCIAL

## 2024-03-20 DIAGNOSIS — Z34.90 ENCOUNTER FOR PREGNANCY RELATED EXAMINATION, ANTEPARTUM (HHS-HCC): ICD-10-CM

## 2024-03-20 PROCEDURE — 76811 OB US DETAILED SNGL FETUS: CPT | Performed by: OBSTETRICS & GYNECOLOGY

## 2024-03-20 PROCEDURE — 76811 OB US DETAILED SNGL FETUS: CPT

## 2024-03-30 DIAGNOSIS — O99.280 HYPOTHYROIDISM IN PREGNANCY, ANTEPARTUM (HHS-HCC): ICD-10-CM

## 2024-03-30 DIAGNOSIS — E03.9 HYPOTHYROIDISM IN PREGNANCY, ANTEPARTUM (HHS-HCC): ICD-10-CM

## 2024-04-01 RX ORDER — LEVOTHYROXINE SODIUM 175 UG/1
175 TABLET ORAL
Qty: 90 TABLET | Refills: 1 | Status: SHIPPED | OUTPATIENT
Start: 2024-04-01 | End: 2024-05-01 | Stop reason: SDUPTHER

## 2024-04-08 ENCOUNTER — TELEPHONE (OUTPATIENT)
Dept: OBSTETRICS AND GYNECOLOGY | Facility: CLINIC | Age: 39
End: 2024-04-08
Payer: COMMERCIAL

## 2024-04-08 NOTE — TELEPHONE ENCOUNTER
Marisa Hopkins called in stating that she is having serve heart burn and Tums are not working for her. Patient also has questions about the baby aspirin that she is on.    SEGUNDO MILES MA

## 2024-04-08 NOTE — TELEPHONE ENCOUNTER
Called patient to discuss symptoms  Identified by name and   Patient states she is having lots of heartburn and Tums haven't helped much  Gave patient list of other medications safe for pregnancy to try to see if these will help with the heartburn, and if they do not to reach back out to us.  Patient wondering if the baby aspirin she is on may be exacerbating this. Informed her likely that it is not and that this is just pregnancy related, but if she noted a correlation between starting this and the heartburn it could be possible. Patient denies noticing any correlation between the 2 and will continue to take the baby aspirin.  All questions and concerns addressed at this time.    ANGEL GomezN RN

## 2024-04-17 ENCOUNTER — APPOINTMENT (OUTPATIENT)
Dept: OBSTETRICS AND GYNECOLOGY | Facility: CLINIC | Age: 39
End: 2024-04-17
Payer: COMMERCIAL

## 2024-04-18 ENCOUNTER — ROUTINE PRENATAL (OUTPATIENT)
Dept: OBSTETRICS AND GYNECOLOGY | Facility: CLINIC | Age: 39
End: 2024-04-18
Payer: COMMERCIAL

## 2024-04-18 VITALS — WEIGHT: 133 LBS | SYSTOLIC BLOOD PRESSURE: 100 MMHG | BODY MASS INDEX: 22.83 KG/M2 | DIASTOLIC BLOOD PRESSURE: 60 MMHG

## 2024-04-18 DIAGNOSIS — E03.9 HYPOTHYROIDISM IN PREGNANCY, ANTEPARTUM (HHS-HCC): ICD-10-CM

## 2024-04-18 DIAGNOSIS — K21.9 GASTROESOPHAGEAL REFLUX DISEASE WITHOUT ESOPHAGITIS: ICD-10-CM

## 2024-04-18 DIAGNOSIS — O99.280 HYPOTHYROIDISM IN PREGNANCY, ANTEPARTUM (HHS-HCC): ICD-10-CM

## 2024-04-18 DIAGNOSIS — Z34.90 ENCOUNTER FOR PREGNANCY RELATED EXAMINATION, ANTEPARTUM (HHS-HCC): Primary | ICD-10-CM

## 2024-04-18 DIAGNOSIS — O09.519 ADVANCED MATERNAL AGE, PRIMIGRAVIDA, ANTEPARTUM (HHS-HCC): ICD-10-CM

## 2024-04-18 PROCEDURE — 84443 ASSAY THYROID STIM HORMONE: CPT

## 2024-04-18 PROCEDURE — 0501F PRENATAL FLOW SHEET: CPT | Performed by: OBSTETRICS & GYNECOLOGY

## 2024-04-18 PROCEDURE — 36415 COLL VENOUS BLD VENIPUNCTURE: CPT

## 2024-04-18 PROCEDURE — 84439 ASSAY OF FREE THYROXINE: CPT

## 2024-04-18 RX ORDER — OMEPRAZOLE 20 MG/1
20 CAPSULE, DELAYED RELEASE ORAL DAILY
Qty: 90 CAPSULE | Refills: 3 | Status: SHIPPED | OUTPATIENT
Start: 2024-04-18 | End: 2024-05-29 | Stop reason: SDUPTHER

## 2024-04-18 NOTE — PROGRESS NOTES
Ob Follow-up    SUBJECTIVE    HPI: Marisa Hopkins is a 38 y.o.  at 24w here for RPNV.  She denies vaginal bleeding, leakage of fluid, decreased fetal movements, and contractions.     OBJECTIVE  Visit Vitals  /60   Wt 60.3 kg (133 lb)   LMP 2023 (Exact Date)   BMI 22.83 kg/m²   OB Status Pregnant   Smoking Status Never   BSA 1.65 m²      See OB flowsheet    ASSESSMENT & PLAN    Marisa Hopkins is a 38 y.o.  at 24w here for the following concerns we addressed today:    Problem List Items Addressed This Visit          Ob-Gyn Problems    Encounter for pregnancy related examination, antepartum ([facility]) - Primary    Overview     Dating:   [x] Initial BMI: 19  [x] Prenatal Labs: Reviewed, missing several labs  [x] Genetic Screening:  Declines  [x] Baby ASA: Yes  [x] Anatomy US:  Normal  [] 1hr GCT at 24-28wks:  [] Tdap (27-36wks):  [x] Flu Shot:  Declines  [] COVID vaccine:   [x] Rhogam (if Rh neg): A positive  [] GBS at 36 wks:  [] Breastfeeding  [] Postpartum Birth control method:   [] 39 weeks discussion of IOL vs. Expectant management:  [x] Mode of delivery:   planning unmedicated birth           Relevant Orders    Syphilis Screen with Reflex    CBC Anemia Panel With Reflex,Pregnancy    Glucose, 1 Hour Screen, Pregnancy    Advanced maternal age, primigravida, antepartum ([facility])    Overview     Declines genetic screening            Other    Hypothyroidism in pregnancy, antepartum ([facility])    Overview     On Synthroid 112mcg  TSH 1st tri 4.77, repeat 20! - Increased synthroid to 125mcg, repeat TSH 10 Increased to 150mcg          2nd tri 3.45, increase synthroid to 175mcg          3rd tri         Relevant Orders    TSH with reflex to Free T4 if abnormal     Other Visit Diagnoses       Gastroesophageal reflux disease without esophagitis        Relevant Medications    omeprazole (PriLOSEC) 20 mg DR capsule          Would like to do the Fresh test(50gm glucose) for GCT next visit  Discussed tDap  vaccine, will consider  GERD - omeprazole  RTC in 4 weeks    Renzo Jeffries MD

## 2024-04-19 LAB
T4 FREE SERPL-MCNC: 1.11 NG/DL (ref 0.78–1.48)
TSH SERPL-ACNC: 0.16 MIU/L (ref 0.44–3.98)

## 2024-05-01 ENCOUNTER — TELEPHONE (OUTPATIENT)
Dept: OBSTETRICS AND GYNECOLOGY | Facility: CLINIC | Age: 39
End: 2024-05-01
Payer: COMMERCIAL

## 2024-05-01 DIAGNOSIS — E03.9 HYPOTHYROIDISM IN PREGNANCY, ANTEPARTUM (HHS-HCC): ICD-10-CM

## 2024-05-01 DIAGNOSIS — O99.280 HYPOTHYROIDISM IN PREGNANCY, ANTEPARTUM (HHS-HCC): ICD-10-CM

## 2024-05-01 RX ORDER — LEVOTHYROXINE SODIUM 150 UG/1
150 TABLET ORAL
Qty: 90 TABLET | Refills: 1 | Status: SHIPPED | OUTPATIENT
Start: 2024-05-01

## 2024-05-01 NOTE — TELEPHONE ENCOUNTER
Called patient to discuss symptoms she is experiencing  Identified by name and   Informed patient heart palpitations are common in pregnancy but other symptoms especially h/a she should be more wary of, and if these symptoms occur again, as well as any SOB, breathing difficulty, irregular pulse or rapid heart rate to be evaluated in L&D.  Patients last TSH levels were low which may also be a factor with this. Patient stated medications were not adjusted and that labs were going to be rechecked in a couple weeks to see if this evened out on its own, but patient wondering if her having these symptoms would change whether or not we would adjust her medication.  Denies any current symptoms.  Informed her I will send this over to Dr. Jeffries to see her thoughts on this and let her know.    BALJEET Gomez RN          Regarding: Palpitations   Contact: [phone]  ----- Message from Linda Patricio MA sent at 2024  8:01 AM EDT -----       ----- Message from Marisa Hopkins to Renzo Jeffries MD sent at 2024  7:30 AM -----   Hi! For about the last 2 weeks I’ve had occasional heart palpitations. Yesterday evening I had palpitations again but also got very hot, sweaty and a headache. I know sometimes a low TSH can cause palpitations but I didn't know if it can also just be a normal pregnancy symptom. Since it was worse yesterday I wanted to ask you what you thought. I’m feeling fine this morning. Thanks.

## 2024-05-15 ENCOUNTER — ROUTINE PRENATAL (OUTPATIENT)
Dept: OBSTETRICS AND GYNECOLOGY | Facility: CLINIC | Age: 39
End: 2024-05-15
Payer: COMMERCIAL

## 2024-05-15 VITALS — DIASTOLIC BLOOD PRESSURE: 69 MMHG | WEIGHT: 137 LBS | SYSTOLIC BLOOD PRESSURE: 122 MMHG | BODY MASS INDEX: 23.52 KG/M2

## 2024-05-15 DIAGNOSIS — E03.9 HYPOTHYROIDISM IN PREGNANCY, ANTEPARTUM (HHS-HCC): ICD-10-CM

## 2024-05-15 DIAGNOSIS — O09.519 ADVANCED MATERNAL AGE, PRIMIGRAVIDA, ANTEPARTUM (HHS-HCC): ICD-10-CM

## 2024-05-15 DIAGNOSIS — Z34.90 ENCOUNTER FOR PREGNANCY RELATED EXAMINATION, ANTEPARTUM (HHS-HCC): Primary | ICD-10-CM

## 2024-05-15 DIAGNOSIS — O99.280 HYPOTHYROIDISM IN PREGNANCY, ANTEPARTUM (HHS-HCC): ICD-10-CM

## 2024-05-15 LAB
ERYTHROCYTE [DISTWIDTH] IN BLOOD BY AUTOMATED COUNT: 12.5 % (ref 11.5–14.5)
GLUCOSE 1H P 50 G GLC PO SERPL-MCNC: 82 MG/DL
HCT VFR BLD AUTO: 36.2 % (ref 36–46)
HGB BLD-MCNC: 12 G/DL (ref 12–16)
MCH RBC QN AUTO: 32.2 PG (ref 26–34)
MCHC RBC AUTO-ENTMCNC: 33.1 G/DL (ref 32–36)
MCV RBC AUTO: 97 FL (ref 80–100)
NRBC BLD-RTO: 0 /100 WBCS (ref 0–0)
PLATELET # BLD AUTO: 160 X10*3/UL (ref 150–450)
RBC # BLD AUTO: 3.73 X10*6/UL (ref 4–5.2)
REFLEX ADDED, ANEMIA PANEL: NORMAL
TREPONEMA PALLIDUM IGG+IGM AB [PRESENCE] IN SERUM OR PLASMA BY IMMUNOASSAY: NONREACTIVE
WBC # BLD AUTO: 9.1 X10*3/UL (ref 4.4–11.3)

## 2024-05-15 PROCEDURE — 36415 COLL VENOUS BLD VENIPUNCTURE: CPT

## 2024-05-15 PROCEDURE — 85027 COMPLETE CBC AUTOMATED: CPT

## 2024-05-15 PROCEDURE — 86780 TREPONEMA PALLIDUM: CPT

## 2024-05-15 PROCEDURE — 0501F PRENATAL FLOW SHEET: CPT | Performed by: OBSTETRICS & GYNECOLOGY

## 2024-05-15 PROCEDURE — 82947 ASSAY GLUCOSE BLOOD QUANT: CPT

## 2024-05-15 ASSESSMENT — EDINBURGH POSTNATAL DEPRESSION SCALE (EPDS)
I HAVE FELT SCARED OR PANICKY FOR NO GOOD REASON: NO, NOT AT ALL
THINGS HAVE BEEN GETTING ON TOP OF ME: NO, I HAVE BEEN COPING AS WELL AS EVER
I HAVE BEEN ANXIOUS OR WORRIED FOR NO GOOD REASON: NO, NOT AT ALL
I HAVE BEEN SO UNHAPPY THAT I HAVE HAD DIFFICULTY SLEEPING: NOT AT ALL
I HAVE FELT SAD OR MISERABLE: NO, NOT AT ALL
I HAVE BEEN ABLE TO LAUGH AND SEE THE FUNNY SIDE OF THINGS: AS MUCH AS I ALWAYS COULD
I HAVE LOOKED FORWARD WITH ENJOYMENT TO THINGS: AS MUCH AS I EVER DID
I HAVE BEEN SO UNHAPPY THAT I HAVE BEEN CRYING: NO, NEVER
TOTAL SCORE: 0
I HAVE BLAMED MYSELF UNNECESSARILY WHEN THINGS WENT WRONG: NO, NEVER
THE THOUGHT OF HARMING MYSELF HAS OCCURRED TO ME: NEVER

## 2024-05-15 NOTE — PROGRESS NOTES
Ob Follow-up    SUBJECTIVE    HPI: Marisa Hopkins is a 38 y.o.  at 27w6d here for RPNV.  She denies vaginal bleeding, leakage of fluid, decreased fetal movements, and contractions.     OBJECTIVE  Visit Vitals  /69   Wt 62.1 kg (137 lb)   LMP 2023 (Exact Date)   BMI 23.52 kg/m²   OB Status Pregnant   Smoking Status Never   BSA 1.67 m²      See OB flowsheet    ASSESSMENT & PLAN    Marisa Hopkins is a 38 y.o.  at 27w6d here for the following concerns we addressed today:    Problem List Items Addressed This Visit          Ob-Gyn Problems    Encounter for pregnancy related examination, antepartum ([facility]) - Primary    Overview     Dating:   [x] Initial BMI: 19  [x] Prenatal Labs: Reviewed, missing several labs  [x] Genetic Screening:  Declines  [x] Baby ASA: Yes  [x] Anatomy US:  Normal  [] 1hr GCT at 24-28wks:  [x] Tdap (27-36wks): Done  [x] Flu Shot:  Declines  [x] Rhogam (if Rh neg): A positive  [] GBS at 36 wks:  [] Breastfeeding  [] Postpartum Birth control method:   [] 39 weeks discussion of IOL vs. Expectant management:  [x] Mode of delivery:   planning unmedicated birth           Advanced maternal age, primigravida, antepartum ([facility])    Overview     Declines genetic screening            Other    Hypothyroidism in pregnancy, antepartum ([facility])    Overview     On Synthroid 112mcg  TSH 1st tri 4.77, repeat 20! - Increased synthroid to 125mcg, repeat TSH 10 Increased to 150mcg          2nd tri 3.45, increase synthroid to 175mcg, 4w TSH .16          3rd tri          GCT today  GERD - omeprazole  RTC in 2 weeks    Renzo Jeffries MD

## 2024-05-28 NOTE — PROGRESS NOTES
Ob Follow-up    SUBJECTIVE    HPI: Marisa Hopkins is a 38 y.o.  at 29w6d here for RPNV.  She denies vaginal bleeding, leakage of fluid, decreased fetal movements, and contractions.     OBJECTIVE  Visit Vitals  /62   Wt 64.9 kg (143 lb)   LMP 2023 (Exact Date)   BMI 24.55 kg/m²   OB Status Pregnant   Smoking Status Never   BSA 1.71 m²      See OB flowsheet    ASSESSMENT & PLAN    Marisa Hopkins is a 38 y.o.  at 29w6d here for the following concerns we addressed today:    Problem List Items Addressed This Visit          Ob-Gyn Problems    Encounter for pregnancy related examination, antepartum ([facility]) - Primary    Overview     Dating:   [x] Initial BMI: 19  [x] Prenatal Labs: Reviewed, missing several labs  [x] Genetic Screening:  Declines  [x] Baby ASA: Yes  [x] Anatomy US:  Normal  [x] 1hr GCT at 24-28wks: Normal  [x] Tdap (27-36wks): Done  [x] Flu Shot:  Declines  [x] Rhogam (if Rh neg): A positive  [] GBS at 36 wks:  [x] Breastfeeding:  Yes  [] Postpartum Birth control method:   [] 39 weeks discussion of IOL vs. Expectant management:  [x] Mode of delivery:   planning unmedicated birth, reviewed water immersion and intermittent monitoring guidelines, HLIV           Advanced maternal age, primigravida, antepartum ([facility])    Overview     Declines genetic screening            Other    Hypothyroidism in pregnancy, antepartum ([facility])    Overview     On Synthroid 112mcg  TSH 1st tri 4.77, repeat 20! - Increased synthroid to 125mcg, repeat TSH 10 Increased to 150mcg          2nd tri 3.45, increase synthroid to 175mcg, 4w TSH .16 decreased to 150mcg          3rd tri         Relevant Orders    TSH with reflex to Free T4 if abnormal     Other Visit Diagnoses       Gastroesophageal reflux disease without esophagitis        Relevant Medications    omeprazole (PriLOSEC) 20 mg DR capsule        Going to [facility] in [place]  Repeat TSH  GERD - increase omeprazole to BID  RTC in 2 weeks    Renzo HAMMER  MD Mervin

## 2024-05-29 ENCOUNTER — ROUTINE PRENATAL (OUTPATIENT)
Dept: OBSTETRICS AND GYNECOLOGY | Facility: CLINIC | Age: 39
End: 2024-05-29
Payer: COMMERCIAL

## 2024-05-29 ENCOUNTER — LAB (OUTPATIENT)
Dept: LAB | Facility: LAB | Age: 39
End: 2024-05-29
Payer: COMMERCIAL

## 2024-05-29 VITALS — WEIGHT: 143 LBS | BODY MASS INDEX: 24.55 KG/M2 | DIASTOLIC BLOOD PRESSURE: 62 MMHG | SYSTOLIC BLOOD PRESSURE: 100 MMHG

## 2024-05-29 DIAGNOSIS — O99.280 HYPOTHYROIDISM IN PREGNANCY, ANTEPARTUM (HHS-HCC): ICD-10-CM

## 2024-05-29 DIAGNOSIS — K21.9 GASTROESOPHAGEAL REFLUX DISEASE WITHOUT ESOPHAGITIS: ICD-10-CM

## 2024-05-29 DIAGNOSIS — Z34.90 ENCOUNTER FOR PREGNANCY RELATED EXAMINATION, ANTEPARTUM (HHS-HCC): Primary | ICD-10-CM

## 2024-05-29 DIAGNOSIS — E03.9 HYPOTHYROIDISM IN PREGNANCY, ANTEPARTUM (HHS-HCC): ICD-10-CM

## 2024-05-29 DIAGNOSIS — O09.519 ADVANCED MATERNAL AGE, PRIMIGRAVIDA, ANTEPARTUM (HHS-HCC): ICD-10-CM

## 2024-05-29 PROCEDURE — 0501F PRENATAL FLOW SHEET: CPT | Performed by: OBSTETRICS & GYNECOLOGY

## 2024-05-29 RX ORDER — OMEPRAZOLE 20 MG/1
20 CAPSULE, DELAYED RELEASE ORAL 2 TIMES DAILY
Qty: 60 CAPSULE | Refills: 1 | Status: SHIPPED | OUTPATIENT
Start: 2024-05-29 | End: 2025-05-29

## 2024-06-10 ENCOUNTER — TELEPHONE (OUTPATIENT)
Dept: OBSTETRICS AND GYNECOLOGY | Facility: CLINIC | Age: 39
End: 2024-06-10
Payer: COMMERCIAL

## 2024-06-10 ENCOUNTER — LAB (OUTPATIENT)
Dept: LAB | Facility: LAB | Age: 39
End: 2024-06-10
Payer: COMMERCIAL

## 2024-06-10 DIAGNOSIS — E07.9 THYROID DISORDER: ICD-10-CM

## 2024-06-10 LAB — TSH SERPL DL<=0.05 MIU/L-ACNC: 0.61 MIU/L (ref 0.27–4.2)

## 2024-06-10 PROCEDURE — 36415 COLL VENOUS BLD VENIPUNCTURE: CPT

## 2024-06-10 PROCEDURE — 84443 ASSAY THYROID STIM HORMONE: CPT

## 2024-06-10 NOTE — TELEPHONE ENCOUNTER
Called patient to let her know order has been fixed and she can have labs drawn whenever.  Identified by name and   Informed patient order has been resent and she can have this drawn whenever  Patient verbalized understanding, no questions or concerns at this time.    ANGEL GomezN RN

## 2024-06-10 NOTE — TELEPHONE ENCOUNTER
Marisa Hopkins called in stating that she tried to go and have her TSH done and they told her she couldn't have it drawn be the order has a date of 8/29/2024 on it. Patient would like this fixed so that she could go and have the order completed.    SEGUNDO MILES MA

## 2024-06-12 ENCOUNTER — APPOINTMENT (OUTPATIENT)
Dept: OBSTETRICS AND GYNECOLOGY | Facility: CLINIC | Age: 39
End: 2024-06-12
Payer: COMMERCIAL

## 2024-06-12 VITALS — WEIGHT: 140 LBS | DIASTOLIC BLOOD PRESSURE: 67 MMHG | SYSTOLIC BLOOD PRESSURE: 121 MMHG | BODY MASS INDEX: 24.03 KG/M2

## 2024-06-12 DIAGNOSIS — O99.280 HYPOTHYROIDISM IN PREGNANCY, ANTEPARTUM (HHS-HCC): ICD-10-CM

## 2024-06-12 DIAGNOSIS — E03.9 HYPOTHYROIDISM IN PREGNANCY, ANTEPARTUM (HHS-HCC): ICD-10-CM

## 2024-06-12 DIAGNOSIS — O09.519 ADVANCED MATERNAL AGE, PRIMIGRAVIDA, ANTEPARTUM (HHS-HCC): ICD-10-CM

## 2024-06-12 DIAGNOSIS — K21.9 GASTROESOPHAGEAL REFLUX DISEASE WITHOUT ESOPHAGITIS: ICD-10-CM

## 2024-06-12 DIAGNOSIS — Z34.90 ENCOUNTER FOR PREGNANCY RELATED EXAMINATION, ANTEPARTUM (HHS-HCC): Primary | ICD-10-CM

## 2024-06-12 PROCEDURE — 0501F PRENATAL FLOW SHEET: CPT | Performed by: OBSTETRICS & GYNECOLOGY

## 2024-06-12 RX ORDER — OMEPRAZOLE AND SODIUM BICARBONATE 40; 1100 MG/1; MG/1
1 CAPSULE ORAL
Qty: 30 CAPSULE | Refills: 2 | Status: SHIPPED | OUTPATIENT
Start: 2024-06-12 | End: 2024-06-26

## 2024-06-12 NOTE — PROGRESS NOTES
Ob Follow-up    SUBJECTIVE    HPI: Marisa Hopkins is a 38 y.o.  at 31w6d here for RPNV.  She denies vaginal bleeding, leakage of fluid, decreased fetal movements, and contractions.     OBJECTIVE  Visit Vitals  /67   Wt 63.5 kg (140 lb)   LMP 2023 (Exact Date)   BMI 24.03 kg/m²   OB Status Pregnant   Smoking Status Never   BSA 1.69 m²      See OB flowsheet    ASSESSMENT & PLAN    Marisa Hopkins is a 38 y.o.  at 31w6d here for the following concerns we addressed today:    Problem List Items Addressed This Visit          Ob-Gyn Problems    Encounter for pregnancy related examination, antepartum ([facility]) - Primary    Overview     Dating:   [x] Initial BMI: 19  [x] Prenatal Labs: Reviewed, missing several labs  [x] Genetic Screening:  Declines  [x] Baby ASA: Yes  [x] Anatomy US:  Normal  [x] 1hr GCT at 24-28wks: Normal  [x] Tdap (27-36wks): Done  [x] Flu Shot:  Declines  [x] Rhogam (if Rh neg): A positive  [] GBS at 36 wks:  [x] Breastfeeding:  Yes  [x] Labor preferences:  Has , taking classes  [] Postpartum Birth control method:   [] 39 weeks discussion of IOL vs. Expectant management:  [x] Mode of delivery:   planning unmedicated birth, reviewed water immersion and intermittent monitoring guidelines, HLIV           Advanced maternal age, primigravida, antepartum ([facility])    Overview     Declines genetic screening            Other    Hypothyroidism in pregnancy, antepartum ([facility])    Overview     On Synthroid 112mcg  TSH 1st tri 4.77, repeat 20! - Increased synthroid to 125mcg, repeat TSH 10 Increased to 150mcg          2nd tri 3.45, increase synthroid to 175mcg, 4w TSH .16 decreased to 150mcg          3rd tri .66          Other Visit Diagnoses       Gastroesophageal reflux disease without esophagitis        Relevant Medications    omeprazole-sodium bicarbonate (Zegerid) 40-1.1 mg-gram capsule          Going to [facility] in [place] next week  GERD - increased omeprazole to BID, but feels  like it's worse.  Will switch to Zegerid.  RTC in 2 weeks    Renzo Jeffries MD

## 2024-06-13 ENCOUNTER — TELEPHONE (OUTPATIENT)
Dept: OBSTETRICS AND GYNECOLOGY | Facility: CLINIC | Age: 39
End: 2024-06-13
Payer: COMMERCIAL

## 2024-06-13 NOTE — TELEPHONE ENCOUNTER
Called patient regarding issues with prescription  Identified by name and   Informed patient that insurance is not covering alternative for medication  Discussed option to use [facility], patient will look into this as well as try to find this OTC  Will reach out to us if she has any questions or concerns.    ANGEL GomezN RN

## 2024-06-25 NOTE — PROGRESS NOTES
Ob Follow-up    SUBJECTIVE    HPI: Marisa Hopkins is a 38 y.o.  at 33w6d here for RPNV.  She denies vaginal bleeding, leakage of fluid, decreased fetal movements, and contractions.     OBJECTIVE  Visit Vitals  /80   Wt 65.3 kg (144 lb)   LMP 2023 (Exact Date)   BMI 24.72 kg/m²   OB Status Pregnant   Smoking Status Never   BSA 1.72 m²      See OB flowsheet    ASSESSMENT & PLAN    Marisa Hopkins is a 38 y.o.  at 33w6d here for the following concerns we addressed today:    Problem List Items Addressed This Visit          Ob-Gyn Problems    Encounter for pregnancy related examination, antepartum ([facility]) - Primary    Overview     Dating:   [x] Initial BMI: 19  [x] Prenatal Labs: Reviewed, missing several labs  [x] Genetic Screening:  Declines  [x] Baby ASA: Yes  [x] Anatomy US:  Normal  [x] 1hr GCT at 24-28wks: Normal  [x] Tdap (27-36wks): Done  [x] Flu Shot:  Declines  [x] Rhogam (if Rh neg): A positive  [] GBS at 36 wks:  [x] Breastfeeding:  Yes  [x] Labor preferences:  Has , taking classes  [] Postpartum Birth control method:   [] 39 weeks discussion of IOL vs. Expectant management:  [x] Mode of delivery:   planning unmedicated birth, reviewed water immersion and intermittent monitoring guidelines, HLIV           Advanced maternal age, primigravida, antepartum ([facility])    Overview     Declines genetic screening            Other    Hypothyroidism in pregnancy, antepartum ([facility])    Overview     On Synthroid 112mcg  TSH 1st tri 4.77, repeat 20! - Increased synthroid to 125mcg, repeat TSH 10 Increased to 150mcg          2nd tri 3.45, increase synthroid to 175mcg, 4w TSH .16 decreased to 150mcg          3rd tri .66            GERD - Switched to Zegerid from omeprazole last week, but didn't take bc it wasn't covered by insurance.  Still taking omeprazole with some improvement.  GBS next visit  RTC in 2 weeks    Renzo Jeffries MD

## 2024-06-26 ENCOUNTER — APPOINTMENT (OUTPATIENT)
Dept: OBSTETRICS AND GYNECOLOGY | Facility: CLINIC | Age: 39
End: 2024-06-26
Payer: COMMERCIAL

## 2024-06-26 VITALS — SYSTOLIC BLOOD PRESSURE: 125 MMHG | BODY MASS INDEX: 24.72 KG/M2 | WEIGHT: 144 LBS | DIASTOLIC BLOOD PRESSURE: 80 MMHG

## 2024-06-26 DIAGNOSIS — E03.9 HYPOTHYROIDISM IN PREGNANCY, ANTEPARTUM (HHS-HCC): ICD-10-CM

## 2024-06-26 DIAGNOSIS — O09.519 ADVANCED MATERNAL AGE, PRIMIGRAVIDA, ANTEPARTUM (HHS-HCC): ICD-10-CM

## 2024-06-26 DIAGNOSIS — Z34.90 ENCOUNTER FOR PREGNANCY RELATED EXAMINATION, ANTEPARTUM (HHS-HCC): Primary | ICD-10-CM

## 2024-06-26 DIAGNOSIS — O99.280 HYPOTHYROIDISM IN PREGNANCY, ANTEPARTUM (HHS-HCC): ICD-10-CM

## 2024-06-26 PROCEDURE — 0501F PRENATAL FLOW SHEET: CPT | Performed by: OBSTETRICS & GYNECOLOGY

## 2024-07-09 NOTE — PROGRESS NOTES
Ob Follow-up    SUBJECTIVE    HPI: Marisa Hopkins is a 38 y.o.  at 35w6d here for RPNV.  She denies vaginal bleeding, leakage of fluid, decreased fetal movements, and contractions.     OBJECTIVE  Visit Vitals  /76   Wt 65.8 kg (145 lb)   LMP 2023 (Exact Date)   BMI 24.89 kg/m²   OB Status Pregnant   Smoking Status Never   BSA 1.72 m²      See OB flowsheet    ASSESSMENT & PLAN    Marisa Hopkins is a 38 y.o.  at 35w6d here for the following concerns we addressed today:    Problem List Items Addressed This Visit          Ob-Gyn Problems    Encounter for pregnancy related examination, antepartum ([facility]) - Primary    Overview     Dating:   [x] Initial BMI: 19  [x] Prenatal Labs: Reviewed, missing several labs  [x] Genetic Screening:  Declines  [x] Baby ASA: Yes  [x] Anatomy US:  Normal  [x] 1hr GCT at 24-28wks: Normal  [x] Tdap (27-36wks): Done  [x] Flu Shot:  Declines  [x] Rhogam (if Rh neg): A positive  [] GBS at 36 wks:  [x] Breastfeeding:  Yes  [x] Labor preferences:  Has , taking classes  [] Postpartum Birth control method:   [] 39 weeks discussion of IOL vs. Expectant management:  [x] Mode of delivery:   planning unmedicated birth, reviewed water immersion and intermittent monitoring guidelines, HLIV           Relevant Orders    Group B Streptococcus (GBS) Prenatal Screen, Culture    Advanced maternal age, primigravida, antepartum ([facility])    Overview     Declines genetic screening            Other    Hypothyroidism in pregnancy, antepartum ([facility])    Overview     On Synthroid 112mcg  TSH 1st tri 4.77, repeat 20! - Increased synthroid to 125mcg, repeat TSH 10 Increased to 150mcg          2nd tri 3.45, increase synthroid to 175mcg, 4w TSH .16 decreased to 150mcg          3rd tri .66          Other Visit Diagnoses       Gastroesophageal reflux disease without esophagitis        Relevant Medications    omeprazole (PriLOSEC) 20 mg DR capsule          GBS today  RTC in 1 week    Renzo  JAQUELIN Jeffries MD

## 2024-07-10 ENCOUNTER — APPOINTMENT (OUTPATIENT)
Dept: OBSTETRICS AND GYNECOLOGY | Facility: CLINIC | Age: 39
End: 2024-07-10
Payer: COMMERCIAL

## 2024-07-10 VITALS — DIASTOLIC BLOOD PRESSURE: 76 MMHG | BODY MASS INDEX: 24.89 KG/M2 | SYSTOLIC BLOOD PRESSURE: 118 MMHG | WEIGHT: 145 LBS

## 2024-07-10 DIAGNOSIS — Z34.90 ENCOUNTER FOR PREGNANCY RELATED EXAMINATION, ANTEPARTUM (HHS-HCC): Primary | ICD-10-CM

## 2024-07-10 DIAGNOSIS — K21.9 GASTROESOPHAGEAL REFLUX DISEASE WITHOUT ESOPHAGITIS: ICD-10-CM

## 2024-07-10 DIAGNOSIS — O99.280 HYPOTHYROIDISM IN PREGNANCY, ANTEPARTUM (HHS-HCC): ICD-10-CM

## 2024-07-10 DIAGNOSIS — O09.519 ADVANCED MATERNAL AGE, PRIMIGRAVIDA, ANTEPARTUM (HHS-HCC): ICD-10-CM

## 2024-07-10 DIAGNOSIS — E03.9 HYPOTHYROIDISM IN PREGNANCY, ANTEPARTUM (HHS-HCC): ICD-10-CM

## 2024-07-10 PROCEDURE — 87081 CULTURE SCREEN ONLY: CPT

## 2024-07-10 PROCEDURE — 0501F PRENATAL FLOW SHEET: CPT | Performed by: OBSTETRICS & GYNECOLOGY

## 2024-07-10 RX ORDER — OMEPRAZOLE 20 MG/1
20 CAPSULE, DELAYED RELEASE ORAL 2 TIMES DAILY
Qty: 60 CAPSULE | Refills: 1 | Status: SHIPPED | OUTPATIENT
Start: 2024-07-10 | End: 2025-07-10

## 2024-07-13 LAB — GP B STREP GENITAL QL CULT: NORMAL

## 2024-07-17 ENCOUNTER — APPOINTMENT (OUTPATIENT)
Dept: OBSTETRICS AND GYNECOLOGY | Facility: CLINIC | Age: 39
End: 2024-07-17
Payer: COMMERCIAL

## 2024-07-17 VITALS — BODY MASS INDEX: 25.06 KG/M2 | WEIGHT: 146 LBS | SYSTOLIC BLOOD PRESSURE: 122 MMHG | DIASTOLIC BLOOD PRESSURE: 77 MMHG

## 2024-07-17 DIAGNOSIS — O99.280 HYPOTHYROIDISM IN PREGNANCY, ANTEPARTUM (HHS-HCC): Primary | ICD-10-CM

## 2024-07-17 DIAGNOSIS — Z34.90 ENCOUNTER FOR PREGNANCY RELATED EXAMINATION, ANTEPARTUM (HHS-HCC): ICD-10-CM

## 2024-07-17 DIAGNOSIS — E03.9 HYPOTHYROIDISM IN PREGNANCY, ANTEPARTUM (HHS-HCC): Primary | ICD-10-CM

## 2024-07-17 DIAGNOSIS — O09.519 ADVANCED MATERNAL AGE, PRIMIGRAVIDA, ANTEPARTUM (HHS-HCC): ICD-10-CM

## 2024-07-17 PROCEDURE — 0501F PRENATAL FLOW SHEET: CPT | Performed by: OBSTETRICS & GYNECOLOGY

## 2024-07-17 NOTE — PROGRESS NOTES
Ob Follow-up    SUBJECTIVE    HPI: Marisa Hopkins is a 38 y.o.  at 36w6d here for RPNV.  She denies vaginal bleeding, leakage of fluid, decreased fetal movements, and contractions.     OBJECTIVE  Visit Vitals  /77   Wt 66.2 kg (146 lb)   LMP 2023 (Exact Date)   BMI 25.06 kg/m²   OB Status Pregnant   Smoking Status Never   BSA 1.73 m²      See OB flowsheet    ASSESSMENT & PLAN    Marisa Hopkins is a 38 y.o.  at 36w6d here for the following concerns we addressed today:    Problem List Items Addressed This Visit          Ob-Gyn Problems    Encounter for pregnancy related examination, antepartum ([facility])    Overview     Dating:   [x] Initial BMI: 19  [x] Prenatal Labs: Reviewed, missing several labs  [x] Genetic Screening:  Declines  [x] Fetal sex:  It's a surprise!  [x] Baby ASA: Yes  [x] Anatomy US:  Normal  [x] 1hr GCT at 24-28wks: Normal  [x] Tdap (27-36wks): Done  [x] Flu Shot:  Declines  [x] Rhogam (if Rh neg): A positive  [x] GBS at 36 wks: Negative  [x] Breastfeeding:  Yes  [x] Labor preferences:  Has , taking classes  [] Postpartum Birth control method:   [x] 39 weeks discussion of IOL vs. Expectant management: Hoping for spontaneous labor  [x] Mode of delivery:   planning unmedicated birth, reviewed water immersion and intermittent monitoring guidelines, HLIV           Advanced maternal age, primigravida, antepartum ([facility])    Overview     Declines genetic screening            Other    Hypothyroidism in pregnancy, antepartum ([facility]) - Primary    Overview     On Synthroid 112mcg  TSH 1st tri 4.77, repeat 20! - Increased synthroid to 125mcg, repeat TSH 10 Increased to 150mcg          2nd tri 3.45, increase synthroid to 175mcg, 4w TSH .16 decreased to 150mcg          3rd tri .66          GBS negative  Labor and preeclampsia precautions  RTC in 1 week    Renzo Jeffries MD

## 2024-07-24 ENCOUNTER — APPOINTMENT (OUTPATIENT)
Dept: OBSTETRICS AND GYNECOLOGY | Facility: CLINIC | Age: 39
End: 2024-07-24
Payer: COMMERCIAL

## 2024-07-24 VITALS — BODY MASS INDEX: 25.23 KG/M2 | DIASTOLIC BLOOD PRESSURE: 74 MMHG | SYSTOLIC BLOOD PRESSURE: 126 MMHG | WEIGHT: 147 LBS

## 2024-07-24 DIAGNOSIS — Z34.90 ENCOUNTER FOR PREGNANCY RELATED EXAMINATION, ANTEPARTUM (HHS-HCC): Primary | ICD-10-CM

## 2024-07-24 DIAGNOSIS — E03.9 HYPOTHYROIDISM IN PREGNANCY, ANTEPARTUM (HHS-HCC): ICD-10-CM

## 2024-07-24 DIAGNOSIS — O09.519 ADVANCED MATERNAL AGE, PRIMIGRAVIDA, ANTEPARTUM (HHS-HCC): ICD-10-CM

## 2024-07-24 DIAGNOSIS — O99.280 HYPOTHYROIDISM IN PREGNANCY, ANTEPARTUM (HHS-HCC): ICD-10-CM

## 2024-07-24 PROCEDURE — 0501F PRENATAL FLOW SHEET: CPT | Performed by: OBSTETRICS & GYNECOLOGY

## 2024-07-24 NOTE — PROGRESS NOTES
Ob Follow-up    SUBJECTIVE    HPI: Marisa Hopkins is a 38 y.o.  at 37w6d here for RPNV.  She denies vaginal bleeding, leakage of fluid, decreased fetal movements, and contractions.     OBJECTIVE  Visit Vitals  /74   Wt 66.7 kg (147 lb)   LMP 2023 (Exact Date)   BMI 25.23 kg/m²   OB Status Pregnant   Smoking Status Never   BSA 1.74 m²      See OB flowsheet    ASSESSMENT & PLAN    Marisa Hopkins is a 38 y.o.  at 37w6d here for the following concerns we addressed today:    Problem List Items Addressed This Visit          Ob-Gyn Problems    Encounter for pregnancy related examination, antepartum ([facility]) - Primary    Overview     Dating:   [x] Initial BMI: 19  [x] Prenatal Labs: Reviewed, missing several labs  [x] Genetic Screening:  Declines  [x] Fetal sex:  It's a surprise!  [x] Baby ASA: Yes  [x] Anatomy US:  Normal  [x] 1hr GCT at 24-28wks: Normal  [x] Tdap (27-36wks): Done  [x] Flu Shot:  Declines  [x] Rhogam (if Rh neg): A positive  [x] GBS at 36 wks: Negative  [x] Breastfeeding:  Yes  [x] Labor preferences:  Has , taking classes  [] Postpartum Birth control method:   [x] 39 weeks discussion of IOL vs. Expectant management: Hoping for spontaneous labor  [x] Mode of delivery:   planning unmedicated birth, reviewed water immersion and intermittent monitoring guidelines, HLIV           Advanced maternal age, primigravida, antepartum ([facility])    Overview     Declines genetic screening            Other    Hypothyroidism in pregnancy, antepartum ([facility])    Overview     On Synthroid 112mcg  TSH 1st tri 4.77, repeat 20! - Increased synthroid to 125mcg, repeat TSH 10 Increased to 150mcg          2nd tri 3.45, increase synthroid to 175mcg, 4w TSH .16 decreased to 150mcg          3rd tri .66          3 day history of diarrhea - call if persists, increase fluid intake.  Labor and preeclampsia precautions  RTC in 1 week    Renzo Jeffries MD

## 2024-07-30 NOTE — PROGRESS NOTES
Ob Follow-up    SUBJECTIVE    HPI: Marisa Hopkins is a 38 y.o.  at 38w6d here for RPNV.  She denies vaginal bleeding, leakage of fluid, decreased fetal movements, and contractions.     OBJECTIVE  Visit Vitals  /76   Wt 69.4 kg (153 lb)   LMP 2023 (Exact Date)   BMI 26.26 kg/m²   OB Status Pregnant   Smoking Status Never   BSA 1.77 m²      See OB flowsheet    ASSESSMENT & PLAN    Marisa Hopkins is a 38 y.o.  at 38w6d here for the following concerns we addressed today:    Problem List Items Addressed This Visit          Ob-Gyn Problems    Encounter for pregnancy related examination, antepartum ([facility]) - Primary    Overview     Dating:   [x] Initial BMI: 19  [x] Prenatal Labs: Reviewed, missing several labs  [x] Genetic Screening:  Declines  [x] Fetal sex:  It's a surprise!  [x] Baby ASA: Yes  [x] Anatomy US:  Normal  [x] 1hr GCT at 24-28wks: Normal  [x] Tdap (27-36wks): Done  [x] Flu Shot:  Declines  [x] Rhogam (if Rh neg): A positive  [x] GBS at 36 wks: Negative  [x] Breastfeeding:  Yes  [x] Labor preferences:  Has , taking classes  [] Postpartum Birth control method:   [x] 39 weeks discussion of IOL vs. Expectant management: Hoping for spontaneous labor  [x] Mode of delivery:   planning unmedicated birth, reviewed water immersion and intermittent monitoring guidelines, HLIV           Advanced maternal age, primigravida, antepartum ([facility])    Overview     Declines genetic screening            Other    Hypothyroidism in pregnancy, antepartum ([facility])    Overview     On Synthroid 112mcg  TSH 1st tri 4.77, repeat 20! - Increased synthroid to 125mcg, repeat TSH 10 Increased to 150mcg          2nd tri 3.45, increase synthroid to 175mcg, 4w TSH .16 decreased to 150mcg          3rd tri .66          Diarrhea has resolved.  Labor and preeclampsia precautions  RTC in 1 week    Renzo Jeffries MD

## 2024-07-31 ENCOUNTER — APPOINTMENT (OUTPATIENT)
Dept: OBSTETRICS AND GYNECOLOGY | Facility: CLINIC | Age: 39
End: 2024-07-31
Payer: COMMERCIAL

## 2024-07-31 VITALS — DIASTOLIC BLOOD PRESSURE: 76 MMHG | BODY MASS INDEX: 26.26 KG/M2 | SYSTOLIC BLOOD PRESSURE: 125 MMHG | WEIGHT: 153 LBS

## 2024-07-31 DIAGNOSIS — O99.280 HYPOTHYROIDISM IN PREGNANCY, ANTEPARTUM (HHS-HCC): ICD-10-CM

## 2024-07-31 DIAGNOSIS — Z34.90 ENCOUNTER FOR PREGNANCY RELATED EXAMINATION, ANTEPARTUM (HHS-HCC): Primary | ICD-10-CM

## 2024-07-31 DIAGNOSIS — E03.9 HYPOTHYROIDISM IN PREGNANCY, ANTEPARTUM (HHS-HCC): ICD-10-CM

## 2024-07-31 DIAGNOSIS — O09.519 ADVANCED MATERNAL AGE, PRIMIGRAVIDA, ANTEPARTUM (HHS-HCC): ICD-10-CM

## 2024-07-31 PROCEDURE — 0501F PRENATAL FLOW SHEET: CPT | Performed by: OBSTETRICS & GYNECOLOGY

## 2024-08-01 DIAGNOSIS — O99.280 HYPOTHYROIDISM IN PREGNANCY, ANTEPARTUM (HHS-HCC): ICD-10-CM

## 2024-08-01 DIAGNOSIS — E03.9 HYPOTHYROIDISM IN PREGNANCY, ANTEPARTUM (HHS-HCC): ICD-10-CM

## 2024-08-01 RX ORDER — LEVOTHYROXINE SODIUM 150 UG/1
150 TABLET ORAL
Qty: 90 TABLET | Refills: 1 | Status: SHIPPED | OUTPATIENT
Start: 2024-08-01

## 2024-08-01 RX ORDER — LEVOTHYROXINE SODIUM 150 UG/1
150 TABLET ORAL
Qty: 30 TABLET | Refills: 1 | Status: SHIPPED | OUTPATIENT
Start: 2024-08-01 | End: 2024-08-01

## 2024-08-01 NOTE — TELEPHONE ENCOUNTER
Called patient to discuss refill request  Identified by name and   Inquired reason for med change request  Patient will be delivering soon and is aware likely dosing will change, does not want to have to pay for 90 day fill if rx changes  Informed patient we will send in for her, patient prefers brand name drug    ANGEL GomezN RN

## 2024-08-06 NOTE — PROGRESS NOTES
Ob Follow-up    SUBJECTIVE    HPI: Marisa Hopkins is a 38 y.o.  at 39w6d here for RPNV.  She denies vaginal bleeding, decreased fetal movements, and contractions.  +leaking fluid x 2 days, occasional contractions.     OBJECTIVE  Visit Vitals  /62   Wt 69.9 kg (154 lb)   LMP 2023 (Exact Date)   BMI 26.43 kg/m²   OB Status Pregnant   Smoking Status Never   BSA 1.78 m²      SSE:  +pooling, +gush, +nitrizine    ASSESSMENT & PLAN    Marisa Hopkins is a 38 y.o.  at 39w6d here for the following concerns we addressed today:    Problem List Items Addressed This Visit          Ob-Gyn Problems    Encounter for pregnancy related examination, antepartum ([facility])    Overview     Dating:   [x] Initial BMI: 19  [x] Prenatal Labs: Reviewed, missing several labs  [x] Genetic Screening:  Declines  [x] Fetal sex:  It's a surprise!  [x] Baby ASA: Yes  [x] Anatomy US:  Normal  [x] 1hr GCT at 24-28wks: Normal  [x] Tdap (27-36wks): Done  [x] Flu Shot:  Declines  [x] Rhogam (if Rh neg): A positive  [x] GBS at 36 wks: Negative  [x] Breastfeeding:  Yes  [x] Labor preferences:  Has , taking classes  [] Postpartum Birth control method:   [x] 39 weeks discussion of IOL vs. Expectant management: Hoping for spontaneous labor  [x] Mode of delivery:   planning unmedicated birth, reviewed water immersion and intermittent monitoring guidelines, HLIV           Advanced maternal age, primigravida, antepartum ([facility])    Overview     Declines genetic screening            Other    Hypothyroidism in pregnancy, antepartum ([facility]) - Primary    Overview     On Synthroid 112mcg  TSH 1st tri 4.77, repeat 20! - Increased synthroid to 125mcg, repeat TSH 10 Increased to 150mcg          2nd tri 3.45, increase synthroid to 175mcg, 4w TSH .16 decreased to 150mcg          3rd tri .66          Confirmed prelabor rupture of membranes, recommendation to go to labor and delivery.  We had a long discussion regarding my recommendation for  augmentation at this time, oral cytotec vs pitocin.  Marisa is really hoping to minimize interventions, however, we discussed increased risk of maternal infections in the setting of prolonged rupture of membranes.  She is unsure when her water broke, but she thinks it's been at least 24 hours.      Renzo Jeffries MD

## 2024-08-07 ENCOUNTER — ANESTHESIA (OUTPATIENT)
Dept: OBSTETRICS AND GYNECOLOGY | Facility: HOSPITAL | Age: 39
End: 2024-08-07
Payer: COMMERCIAL

## 2024-08-07 ENCOUNTER — ANESTHESIA EVENT (OUTPATIENT)
Dept: OBSTETRICS AND GYNECOLOGY | Facility: HOSPITAL | Age: 39
End: 2024-08-07
Payer: COMMERCIAL

## 2024-08-07 ENCOUNTER — APPOINTMENT (OUTPATIENT)
Dept: OBSTETRICS AND GYNECOLOGY | Facility: CLINIC | Age: 39
End: 2024-08-07
Payer: COMMERCIAL

## 2024-08-07 ENCOUNTER — HOSPITAL ENCOUNTER (OUTPATIENT)
Facility: HOSPITAL | Age: 39
Discharge: HOME | End: 2024-08-07
Attending: OBSTETRICS & GYNECOLOGY | Admitting: OBSTETRICS & GYNECOLOGY
Payer: COMMERCIAL

## 2024-08-07 ENCOUNTER — TELEPHONE (OUTPATIENT)
Dept: OBSTETRICS AND GYNECOLOGY | Facility: CLINIC | Age: 39
End: 2024-08-07

## 2024-08-07 VITALS — BODY MASS INDEX: 26.43 KG/M2 | SYSTOLIC BLOOD PRESSURE: 122 MMHG | WEIGHT: 154 LBS | DIASTOLIC BLOOD PRESSURE: 62 MMHG

## 2024-08-07 VITALS
BODY MASS INDEX: 26.29 KG/M2 | OXYGEN SATURATION: 99 % | TEMPERATURE: 98.4 F | SYSTOLIC BLOOD PRESSURE: 135 MMHG | WEIGHT: 154 LBS | DIASTOLIC BLOOD PRESSURE: 67 MMHG | HEIGHT: 64 IN | RESPIRATION RATE: 18 BRPM | HEART RATE: 52 BPM

## 2024-08-07 DIAGNOSIS — O99.280 HYPOTHYROIDISM IN PREGNANCY, ANTEPARTUM (HHS-HCC): Primary | ICD-10-CM

## 2024-08-07 DIAGNOSIS — Z34.90 ENCOUNTER FOR PREGNANCY RELATED EXAMINATION, ANTEPARTUM (HHS-HCC): ICD-10-CM

## 2024-08-07 DIAGNOSIS — E03.9 HYPOTHYROIDISM IN PREGNANCY, ANTEPARTUM (HHS-HCC): Primary | ICD-10-CM

## 2024-08-07 DIAGNOSIS — O09.519 ADVANCED MATERNAL AGE, PRIMIGRAVIDA, ANTEPARTUM (HHS-HCC): ICD-10-CM

## 2024-08-07 PROBLEM — O42.90: Status: ACTIVE | Noted: 2024-08-07

## 2024-08-07 PROBLEM — Z3A.39 PREGNANCY WITH 39 COMPLETED WEEKS GESTATION (HHS-HCC): Status: ACTIVE | Noted: 2024-08-07

## 2024-08-07 LAB
ABO GROUP (TYPE) IN BLOOD: NORMAL
ANTIBODY SCREEN: NORMAL
ERYTHROCYTE [DISTWIDTH] IN BLOOD BY AUTOMATED COUNT: 12.7 % (ref 11.5–14.5)
HCT VFR BLD AUTO: 38.5 % (ref 36–46)
HGB BLD-MCNC: 12.8 G/DL (ref 12–16)
MCH RBC QN AUTO: 31.3 PG (ref 26–34)
MCHC RBC AUTO-ENTMCNC: 33.2 G/DL (ref 32–36)
MCV RBC AUTO: 94 FL (ref 80–100)
NRBC BLD-RTO: 0 /100 WBCS (ref 0–0)
PLATELET # BLD AUTO: 108 X10*3/UL (ref 150–450)
RBC # BLD AUTO: 4.09 X10*6/UL (ref 4–5.2)
RH FACTOR (ANTIGEN D): NORMAL
TREPONEMA PALLIDUM IGG+IGM AB [PRESENCE] IN SERUM OR PLASMA BY IMMUNOASSAY: NONREACTIVE
WBC # BLD AUTO: 7.2 X10*3/UL (ref 4.4–11.3)

## 2024-08-07 PROCEDURE — 85027 COMPLETE CBC AUTOMATED: CPT | Performed by: OBSTETRICS & GYNECOLOGY

## 2024-08-07 PROCEDURE — 86901 BLOOD TYPING SEROLOGIC RH(D): CPT | Performed by: OBSTETRICS & GYNECOLOGY

## 2024-08-07 PROCEDURE — 0501F PRENATAL FLOW SHEET: CPT | Performed by: OBSTETRICS & GYNECOLOGY

## 2024-08-07 PROCEDURE — 1120000001 HC OB PRIVATE ROOM DAILY

## 2024-08-07 PROCEDURE — 36415 COLL VENOUS BLD VENIPUNCTURE: CPT | Performed by: OBSTETRICS & GYNECOLOGY

## 2024-08-07 PROCEDURE — 99221 1ST HOSP IP/OBS SF/LOW 40: CPT | Performed by: PEDIATRICS

## 2024-08-07 PROCEDURE — 86850 RBC ANTIBODY SCREEN: CPT | Performed by: OBSTETRICS & GYNECOLOGY

## 2024-08-07 PROCEDURE — 86780 TREPONEMA PALLIDUM: CPT | Mod: AHULAB | Performed by: OBSTETRICS & GYNECOLOGY

## 2024-08-07 RX ORDER — HYDRALAZINE HYDROCHLORIDE 20 MG/ML
5 INJECTION INTRAMUSCULAR; INTRAVENOUS ONCE AS NEEDED
Status: CANCELLED | OUTPATIENT
Start: 2024-08-07

## 2024-08-07 RX ORDER — LABETALOL HYDROCHLORIDE 5 MG/ML
20 INJECTION, SOLUTION INTRAVENOUS ONCE AS NEEDED
Status: CANCELLED | OUTPATIENT
Start: 2024-08-07

## 2024-08-07 RX ORDER — TRANEXAMIC ACID 100 MG/ML
1000 INJECTION, SOLUTION INTRAVENOUS ONCE AS NEEDED
Status: CANCELLED | OUTPATIENT
Start: 2024-08-07

## 2024-08-07 RX ORDER — NIFEDIPINE 10 MG/1
10 CAPSULE ORAL ONCE AS NEEDED
Status: CANCELLED | OUTPATIENT
Start: 2024-08-07

## 2024-08-07 RX ORDER — ONDANSETRON 4 MG/1
4 TABLET, FILM COATED ORAL EVERY 6 HOURS PRN
Status: DISCONTINUED | OUTPATIENT
Start: 2024-08-07 | End: 2024-08-07 | Stop reason: HOSPADM

## 2024-08-07 RX ORDER — LIDOCAINE HYDROCHLORIDE 10 MG/ML
30 INJECTION INFILTRATION; PERINEURAL ONCE AS NEEDED
Status: CANCELLED | OUTPATIENT
Start: 2024-08-07

## 2024-08-07 RX ORDER — MISOPROSTOL 200 UG/1
800 TABLET ORAL ONCE AS NEEDED
Status: CANCELLED | OUTPATIENT
Start: 2024-08-07

## 2024-08-07 RX ORDER — SODIUM CHLORIDE, SODIUM LACTATE, POTASSIUM CHLORIDE, CALCIUM CHLORIDE 600; 310; 30; 20 MG/100ML; MG/100ML; MG/100ML; MG/100ML
125 INJECTION, SOLUTION INTRAVENOUS CONTINUOUS
Status: CANCELLED | OUTPATIENT
Start: 2024-08-07

## 2024-08-07 RX ORDER — CARBOPROST TROMETHAMINE 250 UG/ML
250 INJECTION, SOLUTION INTRAMUSCULAR ONCE AS NEEDED
Status: CANCELLED | OUTPATIENT
Start: 2024-08-07

## 2024-08-07 RX ORDER — TERBUTALINE SULFATE 1 MG/ML
0.25 INJECTION SUBCUTANEOUS ONCE AS NEEDED
Status: CANCELLED | OUTPATIENT
Start: 2024-08-07

## 2024-08-07 RX ORDER — OXYTOCIN 10 [USP'U]/ML
10 INJECTION, SOLUTION INTRAMUSCULAR; INTRAVENOUS ONCE AS NEEDED
Status: CANCELLED | OUTPATIENT
Start: 2024-08-07

## 2024-08-07 RX ORDER — LOPERAMIDE HYDROCHLORIDE 2 MG/1
4 CAPSULE ORAL EVERY 2 HOUR PRN
Status: CANCELLED | OUTPATIENT
Start: 2024-08-07

## 2024-08-07 RX ORDER — LEVOTHYROXINE SODIUM 75 UG/1
150 TABLET ORAL
Status: DISCONTINUED | OUTPATIENT
Start: 2024-08-08 | End: 2024-08-07 | Stop reason: HOSPADM

## 2024-08-07 RX ORDER — ONDANSETRON HYDROCHLORIDE 2 MG/ML
4 INJECTION, SOLUTION INTRAVENOUS EVERY 6 HOURS PRN
Status: DISCONTINUED | OUTPATIENT
Start: 2024-08-07 | End: 2024-08-07 | Stop reason: HOSPADM

## 2024-08-07 RX ORDER — OXYTOCIN/0.9 % SODIUM CHLORIDE 30/500 ML
60 PLASTIC BAG, INJECTION (ML) INTRAVENOUS ONCE AS NEEDED
Status: CANCELLED | OUTPATIENT
Start: 2024-08-07

## 2024-08-07 RX ORDER — METOCLOPRAMIDE 10 MG/1
10 TABLET ORAL EVERY 6 HOURS PRN
Status: DISCONTINUED | OUTPATIENT
Start: 2024-08-07 | End: 2024-08-07 | Stop reason: HOSPADM

## 2024-08-07 RX ORDER — METOCLOPRAMIDE HYDROCHLORIDE 5 MG/ML
10 INJECTION INTRAMUSCULAR; INTRAVENOUS EVERY 6 HOURS PRN
Status: DISCONTINUED | OUTPATIENT
Start: 2024-08-07 | End: 2024-08-07 | Stop reason: HOSPADM

## 2024-08-07 RX ORDER — METHYLERGONOVINE MALEATE 0.2 MG/ML
0.2 INJECTION INTRAVENOUS ONCE AS NEEDED
Status: CANCELLED | OUTPATIENT
Start: 2024-08-07

## 2024-08-07 SDOH — HEALTH STABILITY: MENTAL HEALTH: NON-SPECIFIC ACTIVE SUICIDAL THOUGHTS (PAST 1 MONTH): NO

## 2024-08-07 SDOH — SOCIAL STABILITY: SOCIAL INSECURITY: VERBAL ABUSE: DENIES

## 2024-08-07 SDOH — SOCIAL STABILITY: SOCIAL INSECURITY: PHYSICAL ABUSE: DENIES

## 2024-08-07 SDOH — HEALTH STABILITY: MENTAL HEALTH: CURRENT SMOKER: 0

## 2024-08-07 SDOH — SOCIAL STABILITY: SOCIAL INSECURITY: HAVE YOU HAD THOUGHTS OF HARMING ANYONE ELSE?: NO

## 2024-08-07 SDOH — SOCIAL STABILITY: SOCIAL INSECURITY: ABUSE SCREEN: ADULT

## 2024-08-07 SDOH — SOCIAL STABILITY: SOCIAL INSECURITY: ARE THERE ANY APPARENT SIGNS OF INJURIES/BEHAVIORS THAT COULD BE RELATED TO ABUSE/NEGLECT?: NO

## 2024-08-07 SDOH — HEALTH STABILITY: MENTAL HEALTH: WERE YOU ABLE TO COMPLETE ALL THE BEHAVIORAL HEALTH SCREENINGS?: YES

## 2024-08-07 SDOH — SOCIAL STABILITY: SOCIAL INSECURITY: HAS ANYONE EVER THREATENED TO HURT YOUR FAMILY OR YOUR PETS?: NO

## 2024-08-07 SDOH — HEALTH STABILITY: MENTAL HEALTH: SUICIDAL BEHAVIOR (LIFETIME): NO

## 2024-08-07 SDOH — SOCIAL STABILITY: SOCIAL INSECURITY: DO YOU FEEL ANYONE HAS EXPLOITED OR TAKEN ADVANTAGE OF YOU FINANCIALLY OR OF YOUR PERSONAL PROPERTY?: NO

## 2024-08-07 SDOH — HEALTH STABILITY: MENTAL HEALTH: HAVE YOU USED ANY SUBSTANCES (CANABIS, COCAINE, HEROIN, HALLUCINOGENS, INHALANTS, ETC.) IN THE PAST 12 MONTHS?: NO

## 2024-08-07 SDOH — SOCIAL STABILITY: SOCIAL INSECURITY: HAVE YOU HAD ANY THOUGHTS OF HARMING ANYONE ELSE?: NO

## 2024-08-07 SDOH — ECONOMIC STABILITY: HOUSING INSECURITY: DO YOU FEEL UNSAFE GOING BACK TO THE PLACE WHERE YOU ARE LIVING?: NO

## 2024-08-07 SDOH — SOCIAL STABILITY: SOCIAL INSECURITY: DOES ANYONE TRY TO KEEP YOU FROM HAVING/CONTACTING OTHER FRIENDS OR DOING THINGS OUTSIDE YOUR HOME?: NO

## 2024-08-07 SDOH — HEALTH STABILITY: MENTAL HEALTH: HAVE YOU USED ANY PRESCRIPTION DRUGS OTHER THAN PRESCRIBED IN THE PAST 12 MONTHS?: NO

## 2024-08-07 SDOH — HEALTH STABILITY: MENTAL HEALTH: WISH TO BE DEAD (PAST 1 MONTH): NO

## 2024-08-07 SDOH — SOCIAL STABILITY: SOCIAL INSECURITY: ARE YOU OR HAVE YOU BEEN THREATENED OR ABUSED PHYSICALLY, EMOTIONALLY, OR SEXUALLY BY ANYONE?: NO

## 2024-08-07 ASSESSMENT — ACTIVITIES OF DAILY LIVING (ADL)
FEEDING YOURSELF: INDEPENDENT
BATHING: INDEPENDENT
HEARING - LEFT EAR: FUNCTIONAL
JUDGMENT_ADEQUATE_SAFELY_COMPLETE_DAILY_ACTIVITIES: YES
DRESSING YOURSELF: INDEPENDENT
TOILETING: INDEPENDENT
GROOMING: INDEPENDENT
HEARING - RIGHT EAR: FUNCTIONAL
LACK_OF_TRANSPORTATION: NO
WALKS IN HOME: INDEPENDENT
PATIENT'S MEMORY ADEQUATE TO SAFELY COMPLETE DAILY ACTIVITIES?: YES
ADEQUATE_TO_COMPLETE_ADL: YES

## 2024-08-07 ASSESSMENT — PAIN SCALES - GENERAL
PAINLEVEL_OUTOF10: 0 - NO PAIN

## 2024-08-07 ASSESSMENT — PATIENT HEALTH QUESTIONNAIRE - PHQ9
2. FEELING DOWN, DEPRESSED OR HOPELESS: NOT AT ALL
SUM OF ALL RESPONSES TO PHQ9 QUESTIONS 1 & 2: 0
1. LITTLE INTEREST OR PLEASURE IN DOING THINGS: NOT AT ALL

## 2024-08-07 ASSESSMENT — LIFESTYLE VARIABLES
AUDIT-C TOTAL SCORE: 0
AUDIT-C TOTAL SCORE: 0
SKIP TO QUESTIONS 9-10: 1
HOW OFTEN DO YOU HAVE A DRINK CONTAINING ALCOHOL: NEVER
HOW MANY STANDARD DRINKS CONTAINING ALCOHOL DO YOU HAVE ON A TYPICAL DAY: PATIENT DOES NOT DRINK
HOW OFTEN DO YOU HAVE 6 OR MORE DRINKS ON ONE OCCASION: NEVER

## 2024-08-07 NOTE — ANESTHESIA PREPROCEDURE EVALUATION
Patient: Marisa Hopkins    Evaluation Method: In-person visit    Procedure Information    Date: 08/07/24  Procedure: Labor Consult         Relevant Problems   Anesthesia (within normal limits)      Cardiac (within normal limits)      Pulmonary (within normal limits)      Neuro (within normal limits)      GI   (+) Irritable bowel syndrome with diarrhea      /Renal (within normal limits)      Liver (within normal limits)      Endocrine   (+) Hypothyroidism in pregnancy, antepartum ([facility])      Musculoskeletal (within normal limits)      HEENT (within normal limits)      ID (within normal limits)      GYN (within normal limits)       Clinical information reviewed:   Tobacco  Allergies  Meds  Problems  Med Hx  Surg Hx   Fam Hx  Soc   Hx        NPO Detail:  No data recorded     OB/Gyn Evaluation    Present Pregnancy    Patient is pregnant now.   Obstetric History                Physical Exam    Airway  Mallampati: II  TM distance: >3 FB  Neck ROM: full     Cardiovascular - normal exam     Dental - normal exam     Pulmonary - normal exam     Abdominal            Anesthesia Plan    History of general anesthesia?: yes  History of complications of general anesthesia?: no    ASA 2     epidural and other     The patient is not a current smoker.    Anesthetic plan and risks discussed with patient.  Use of blood products discussed with who consented to blood products.    Plan discussed with CRNA.

## 2024-08-07 NOTE — SIGNIFICANT EVENT
Pt has spoken with Dr. Armas and pediatrician about concerns for maternal and fetal well being at this duration of PROM without labor. Pt reiterates desire to decline any intervention and reasons that she would be better off at home in this case. I reiterated risk of maternal or fetal infection, as well as increased risk of  in the event of infection. Pt desires discharge home and signed AMA form enumerating risk of maternal infection, death, , hysterectomy, and fetal infection, death.     Pt agrees to call with any questions or concerns. She and her  will discuss when they might re-present for pitocin in the event that labor continues to be absent.    Dr. Everett aware of status and signature of AMA form.

## 2024-08-07 NOTE — CONSULTS
"Reason For Consult  Discussion about risks to baby if family decides to leave AMA with prolonged rupture.    History Of Present Illness  Marisa Hopkins is a 38 y.o. female presenting with prolonged rupture.     Per the patient, she states that she is sure she has been ruptured since at least Sunday (~72 hours at this point).  Mom verbalized that she perceived the risk of infection related to prolonged rupture to be \"very low\". I discussed the risks to the baby of prolonged rupture and mom including sepsis and the potential management including blood work and IV antibiotics depending on multiple factors at the time of delivery.  I also discussed the risks to baby by not being surrounded by amniotic fluid and the potential for the baby to become stressed without any obvious signs.  Also discussed meconium stained fluid and meconium aspiration, though mom's fluid is clear at this time.  All questions of mom and her partner were answered.      Assessment/Plan   Care per OB. We would be happy to discuss with this patient further if questions arise.    I spent 35 minutes in the professional and overall care of this patient.      Grisel Aiken MD    "

## 2024-08-07 NOTE — TELEPHONE ENCOUNTER
Report called in to [place] at [facility] L&D for pt Marisa Hopkins, presents to office appointment with ruptured membrane (for a couple of day), Confirmed by physician upon exam. She is 39w6d and no signs of fever,Chills,or other complications.    Advised that Pt will need tub room, And coming for Office straight to Triage.    Linda Patricio MA

## 2024-08-07 NOTE — H&P
Obstetrical Admission History and Physical     Marisa Hopkins is a 38 y.o.  at 39w6d. MORA: 2024, by Last Menstrual Period. Estimated fetal weight: . She has had prenatal care with FLAKITA Jeffries MD .    Chief Complaint: Leakage/Loss of Fluid    Assessment/Plan    IUP at 39.6  Prolonged rupture of membranes  AMA  Hypothyroidism    Pt wishes as little intervention as possible. Risks of infection with current prolonged rupture were discussed by Dr. Jeffries and again here. Pt wants to walk and will try breast pump. Discussed any concerning vitals, lab results or fetal tracing we will need to reassess.     Principal Problem:    Pregnancy with 39 completed weeks gestation ([facility])  Active Problems:    Prolonged spontaneous rupture of membranes ([facility])      pregnancy Problems (from 24 to present)       Problem Noted Resolved    Prolonged spontaneous rupture of membranes ([facility]) 2024 by Michelle Armas, DO No    Priority:  Medium      Pregnancy with 39 completed weeks gestation ([facility]) 2024 by Michelle Armas, DO No    Priority:  Medium            Options for delivery have been discussed with the patient and she elects a vaginal delivery.  Labor has been discussed in detail. The risks, benefits, complications, alternatives, expected outcomes, potential problems during recuperation and recovery, and the risks of not performing the procedure were discussed with the patient. The patient stated understanding that the risks of delivery include, but are not limited to: death; reaction to medications; injury to bowel, bladder, ureters, uterus, cervix, vagina, and other pelvic and abdominal structures, infection; blood loss and possible need for transfusion; and potential need for surgery, including hysterectomy. The risks of injury to the infant during delivery were also discussed. All questions were answered. The patient is wishing to continue with plans for a vaginal delivery.    Admit to inpatient status.  I anticipate that this patient will require a stay exceeding at least 2 midnights for delivery and postpartum.  Active management of rupture of membranes.  Management of pregnancy complications, as indicated.    Subjective   Marisa presents to Labor & Delivery with Spontaneous Rupture of Membranes of clear  fluid at ?  hr on either  or  . Good fetal movement. Having contractions q 10 minutes.  She was confirmed rupture at office visit today. Denies vaginal bleeding.         Obstetrical History   OB History    Para Term  AB Living   3 0 0 0 2 0   SAB IAB Ectopic Multiple Live Births   2 0 0 0 0      # Outcome Date GA Lbr [place]/2nd Weight Sex Type Anes PTL Lv   3 Current            2 SAB 23 4w0d       FD      Birth Comments: chemical pregnancy   1 SAB 01/10/23 6w0d       FD       Past Medical History  Past Medical History:   Diagnosis Date    Abnormal Pap smear of cervix     Celiac disease ([facility])     Celiac disease    Contact with and (suspected) exposure to infections with a predominantly sexual mode of transmission 01/10/2019    STD exposure    Disease of thyroid gland     Encounter for removal and reinsertion of intrauterine contraceptive device 2021    Encounter for IUD removal and reinsertion    Encounter for routine checking of intrauterine contraceptive device 2021    IUD check up    Hypothyroidism 2023    Missed  ([facility]) 2023    Multiple nevi 2023    Patellofemoral dysfunction, left 2023    Personal history of other endocrine, nutritional and metabolic disease     History of hypothyroidism    Recurrent pregnancy loss, antepartum condition or complication ([facility])         Past Surgical History   Past Surgical History:   Procedure Laterality Date    CERVICAL BIOPSY  W/ LOOP ELECTRODE EXCISION  03/10/2014    Cervical Loop Electrosurgical Excision (LEEP)    CHOLECYSTECTOMY         Social History  Social History     Tobacco Use    Smoking  status: Never    Smokeless tobacco: Never   Substance Use Topics    Alcohol use: Not Currently     Comment: socially     Substance and Sexual Activity   Drug Use Never       Allergies  Patient has no known allergies.     Medications  Medications Prior to Admission   Medication Sig Dispense Refill Last Dose    aspirin 81 mg EC tablet Take 1 tablet (81 mg) by mouth once daily.   8/6/2024 at 2000    omeprazole (PriLOSEC) 20 mg DR capsule Take 1 capsule (20 mg) by mouth 2 times a day. Do not crush or chew. (Patient taking differently: Take 1 capsule (20 mg) by mouth once daily. Do not crush or chew.) 60 capsule 1 8/6/2024 at 2000    Synthroid 150 mcg tablet TAKE 1 TABLET (150 MCG) BY MOUTH ONCE DAILY IN THE MORNING. TAKE BEFORE MEALS. 90 tablet 1 8/7/2024 at 0700       Objective    Last Vitals  Temp Pulse Resp BP MAP O2 Sat   37.2 °C (99 °F) 55 18 137/75 100 100 %     Physical Examination  GENERAL: Examination reveals a well developed, well nourished, gravid female in no acute distress. She is alert and cooperative.  LUNGS:  breathing unlabored  ABDOMEN: soft, gravid, nontender, nondistended, no abnormal masses, no epigastric pain  FHR is 140 , with Accelerations, and a Category I tracing.    [place] reading: q10, mild     The fetus is in a vertex presentation, determined by ultrasound  CERVIX:  1/50/-2 in office ; MEMBRANES are SROM  EXTREMITIES: no redness or tenderness in the calves or thighs, no edema  NEUROLOGICAL: alert, oriented, normal speech, no focal findings or movement disorder noted  PSYCHOLOGICAL: awake and alert; oriented to person, place, and time    Lab Review  Labs in chart were reviewed.

## 2024-08-08 ENCOUNTER — ANESTHESIA EVENT (OUTPATIENT)
Dept: OBSTETRICS AND GYNECOLOGY | Facility: HOSPITAL | Age: 39
End: 2024-08-08
Payer: COMMERCIAL

## 2024-08-08 ENCOUNTER — HOSPITAL ENCOUNTER (INPATIENT)
Facility: HOSPITAL | Age: 39
LOS: 3 days | Discharge: HOME | End: 2024-08-11
Attending: OBSTETRICS & GYNECOLOGY | Admitting: ADVANCED PRACTICE MIDWIFE
Payer: COMMERCIAL

## 2024-08-08 ENCOUNTER — ANESTHESIA (OUTPATIENT)
Dept: OBSTETRICS AND GYNECOLOGY | Facility: HOSPITAL | Age: 39
End: 2024-08-08
Payer: COMMERCIAL

## 2024-08-08 DIAGNOSIS — Z34.90 ENCOUNTER FOR PREGNANCY RELATED EXAMINATION, ANTEPARTUM (HHS-HCC): ICD-10-CM

## 2024-08-08 DIAGNOSIS — O09.519 ADVANCED MATERNAL AGE, PRIMIGRAVIDA, ANTEPARTUM (HHS-HCC): ICD-10-CM

## 2024-08-08 DIAGNOSIS — O99.280 HYPOTHYROIDISM IN PREGNANCY, ANTEPARTUM (HHS-HCC): ICD-10-CM

## 2024-08-08 DIAGNOSIS — O42.90: ICD-10-CM

## 2024-08-08 DIAGNOSIS — E03.9 HYPOTHYROIDISM IN PREGNANCY, ANTEPARTUM (HHS-HCC): ICD-10-CM

## 2024-08-08 LAB
ABO GROUP (TYPE) IN BLOOD: NORMAL
ANTIBODY SCREEN: NORMAL
ERYTHROCYTE [DISTWIDTH] IN BLOOD BY AUTOMATED COUNT: 12.7 % (ref 11.5–14.5)
HCT VFR BLD AUTO: 46.4 % (ref 36–46)
HGB BLD-MCNC: 15.9 G/DL (ref 12–16)
MCH RBC QN AUTO: 31.6 PG (ref 26–34)
MCHC RBC AUTO-ENTMCNC: 34.3 G/DL (ref 32–36)
MCV RBC AUTO: 92 FL (ref 80–100)
NRBC BLD-RTO: 0 /100 WBCS (ref 0–0)
PLATELET # BLD AUTO: 91 X10*3/UL (ref 150–450)
RBC # BLD AUTO: 5.03 X10*6/UL (ref 4–5.2)
RH FACTOR (ANTIGEN D): NORMAL
WBC # BLD AUTO: 10.6 X10*3/UL (ref 4.4–11.3)

## 2024-08-08 PROCEDURE — 85027 COMPLETE CBC AUTOMATED: CPT | Performed by: ADVANCED PRACTICE MIDWIFE

## 2024-08-08 PROCEDURE — 86901 BLOOD TYPING SEROLOGIC RH(D): CPT | Performed by: ADVANCED PRACTICE MIDWIFE

## 2024-08-08 PROCEDURE — 36415 COLL VENOUS BLD VENIPUNCTURE: CPT | Performed by: ADVANCED PRACTICE MIDWIFE

## 2024-08-08 PROCEDURE — 1120000001 HC OB PRIVATE ROOM DAILY

## 2024-08-08 PROCEDURE — 2500000001 HC RX 250 WO HCPCS SELF ADMINISTERED DRUGS (ALT 637 FOR MEDICARE OP): Performed by: ADVANCED PRACTICE MIDWIFE

## 2024-08-08 RX ORDER — LABETALOL HYDROCHLORIDE 5 MG/ML
20 INJECTION, SOLUTION INTRAVENOUS ONCE AS NEEDED
Status: DISCONTINUED | OUTPATIENT
Start: 2024-08-08 | End: 2024-08-09

## 2024-08-08 RX ORDER — TRANEXAMIC ACID 100 MG/ML
1000 INJECTION, SOLUTION INTRAVENOUS ONCE AS NEEDED
Status: DISCONTINUED | OUTPATIENT
Start: 2024-08-08 | End: 2024-08-09

## 2024-08-08 RX ORDER — METHYLERGONOVINE MALEATE 0.2 MG/ML
0.2 INJECTION INTRAVENOUS ONCE AS NEEDED
Status: DISCONTINUED | OUTPATIENT
Start: 2024-08-08 | End: 2024-08-09

## 2024-08-08 RX ORDER — METOCLOPRAMIDE 10 MG/1
10 TABLET ORAL EVERY 6 HOURS PRN
Status: DISCONTINUED | OUTPATIENT
Start: 2024-08-08 | End: 2024-08-09

## 2024-08-08 RX ORDER — METOCLOPRAMIDE HYDROCHLORIDE 5 MG/ML
10 INJECTION INTRAMUSCULAR; INTRAVENOUS EVERY 6 HOURS PRN
Status: DISCONTINUED | OUTPATIENT
Start: 2024-08-08 | End: 2024-08-09

## 2024-08-08 RX ORDER — HYDRALAZINE HYDROCHLORIDE 20 MG/ML
5 INJECTION INTRAMUSCULAR; INTRAVENOUS ONCE AS NEEDED
Status: DISCONTINUED | OUTPATIENT
Start: 2024-08-08 | End: 2024-08-09

## 2024-08-08 RX ORDER — LOPERAMIDE HYDROCHLORIDE 2 MG/1
4 CAPSULE ORAL EVERY 2 HOUR PRN
Status: DISCONTINUED | OUTPATIENT
Start: 2024-08-08 | End: 2024-08-09

## 2024-08-08 RX ORDER — NIFEDIPINE 10 MG/1
10 CAPSULE ORAL ONCE AS NEEDED
Status: DISCONTINUED | OUTPATIENT
Start: 2024-08-08 | End: 2024-08-09

## 2024-08-08 RX ORDER — OXYTOCIN 10 [USP'U]/ML
10 INJECTION, SOLUTION INTRAMUSCULAR; INTRAVENOUS ONCE AS NEEDED
Status: DISCONTINUED | OUTPATIENT
Start: 2024-08-08 | End: 2024-08-09

## 2024-08-08 RX ORDER — ONDANSETRON HYDROCHLORIDE 2 MG/ML
4 INJECTION, SOLUTION INTRAVENOUS EVERY 6 HOURS PRN
Status: DISCONTINUED | OUTPATIENT
Start: 2024-08-08 | End: 2024-08-09

## 2024-08-08 RX ORDER — CARBOPROST TROMETHAMINE 250 UG/ML
250 INJECTION, SOLUTION INTRAMUSCULAR ONCE AS NEEDED
Status: DISCONTINUED | OUTPATIENT
Start: 2024-08-08 | End: 2024-08-09

## 2024-08-08 RX ORDER — MISOPROSTOL 200 UG/1
800 TABLET ORAL ONCE AS NEEDED
Status: DISCONTINUED | OUTPATIENT
Start: 2024-08-08 | End: 2024-08-09

## 2024-08-08 RX ORDER — LEVOTHYROXINE SODIUM 75 UG/1
150 TABLET ORAL
Status: DISCONTINUED | OUTPATIENT
Start: 2024-08-09 | End: 2024-08-09

## 2024-08-08 RX ORDER — LIDOCAINE HYDROCHLORIDE 10 MG/ML
30 INJECTION INFILTRATION; PERINEURAL ONCE AS NEEDED
Status: DISCONTINUED | OUTPATIENT
Start: 2024-08-08 | End: 2024-08-09

## 2024-08-08 RX ORDER — OXYTOCIN/0.9 % SODIUM CHLORIDE 30/500 ML
60 PLASTIC BAG, INJECTION (ML) INTRAVENOUS ONCE AS NEEDED
Status: DISCONTINUED | OUTPATIENT
Start: 2024-08-08 | End: 2024-08-09

## 2024-08-08 RX ORDER — TERBUTALINE SULFATE 1 MG/ML
0.25 INJECTION SUBCUTANEOUS ONCE AS NEEDED
Status: DISCONTINUED | OUTPATIENT
Start: 2024-08-08 | End: 2024-08-09

## 2024-08-08 RX ORDER — ONDANSETRON 4 MG/1
4 TABLET, FILM COATED ORAL EVERY 6 HOURS PRN
Status: DISCONTINUED | OUTPATIENT
Start: 2024-08-08 | End: 2024-08-09

## 2024-08-08 RX ORDER — ASPIRIN 81 MG/1
81 TABLET ORAL DAILY
COMMUNITY
End: 2024-08-11 | Stop reason: HOSPADM

## 2024-08-08 RX ORDER — SODIUM CHLORIDE, SODIUM LACTATE, POTASSIUM CHLORIDE, CALCIUM CHLORIDE 600; 310; 30; 20 MG/100ML; MG/100ML; MG/100ML; MG/100ML
125 INJECTION, SOLUTION INTRAVENOUS CONTINUOUS
Status: DISCONTINUED | OUTPATIENT
Start: 2024-08-08 | End: 2024-08-09

## 2024-08-08 SDOH — HEALTH STABILITY: MENTAL HEALTH: CURRENT SMOKER: 0

## 2024-08-08 ASSESSMENT — PAIN SCALES - GENERAL
PAINLEVEL_OUTOF10: 0 - NO PAIN

## 2024-08-08 NOTE — ANESTHESIA PREPROCEDURE EVALUATION
Patient: Marisa Hopkins    Evaluation Method: In-person visit    Procedure Information    Date: 24  Procedure: Labor Consult     Pt  @ 40 weeks, admitted to L&D with PROM. Pt desires NCB but now open to epidural given IOL.    Relevant Problems   Anesthesia (within normal limits)      Cardiac (within normal limits)      Pulmonary (within normal limits)      Neuro (within normal limits)      GI   (+) Irritable bowel syndrome with diarrhea      /Renal (within normal limits)      Liver (within normal limits)      Endocrine   (+) Hypothyroidism in pregnancy, antepartum ([facility])      Musculoskeletal (within normal limits)      HEENT (within normal limits)      ID (within normal limits)      GYN (within normal limits)       Clinical information reviewed:   Tobacco  Allergies  Meds  Problems  Med Hx  Surg Hx   Fam Hx  Soc   Hx        NPO Detail:  No data recorded     OB/Gyn Evaluation    Present Pregnancy    Patient is pregnant now.   Obstetric History            Physical Exam    Airway  Mallampati: II  TM distance: >3 FB  Neck ROM: full     Cardiovascular - normal exam     Dental - normal exam     Pulmonary - normal exam     Abdominal          Anesthesia Plan    History of general anesthesia?: yes  History of complications of general anesthesia?: no    ASA 2   (I informed and discussed the risks and benefits of general, spinal and epidural anesthesia with the patient.  The patient expressed her understanding and her questions were answered.  A verbal consent was given by the patient.   )  The patient is not a current smoker.    Anesthetic plan and risks discussed with patient.  Use of blood products discussed with who consented to blood products.    Plan discussed with CRNA.

## 2024-08-08 NOTE — H&P
Obstetrical Admission History and Physical    HPI   Marisa Hopkins is a 38 y.o.  at 40w0d, radha 2024, by Last Menstrual Period who presents after 4 days of ruptured membranes. ROM was confirmed yesterday by Dr. Jeffries in the office. Pt subsequently came to [facility] L&D for monitoring but declined IOL and opted for discharge home AMA. She is back today and ready to begin the induction process but feels very strongly about how she would like this process to go. She was 1cm yesterday in the office and has not been sandi, so she assumes she is likely mostly unchanged. As such, she would like to start the IOL process with buccal cytotec. Pt has taken her temperature periodically at home and remains afebrile. Fluid color remains clear and baby is active.   Pregnancy notable for:   --prolonged ROM  --AMA  --hypothyroidism  --gbs negative    Obstetrical History   OB History    Para Term  AB Living   3 0 0 0 2 0   SAB IAB Ectopic Multiple Live Births   2 0 0 0 0      # Outcome Date GA Lbr [place]/2nd Weight Sex Type Anes PTL Lv   3 Current            2 SAB 23 4w0d       FD      Birth Comments: chemical pregnancy   1 SAB 01/10/23 6w0d       FD     Past Medical History  Past Medical History:   Diagnosis Date    Abnormal Pap smear of cervix     Celiac disease ([facility])     Celiac disease    Contact with and (suspected) exposure to infections with a predominantly sexual mode of transmission 01/10/2019    STD exposure    Disease of thyroid gland     Encounter for removal and reinsertion of intrauterine contraceptive device 2021    Encounter for IUD removal and reinsertion    Encounter for routine checking of intrauterine contraceptive device 2021    IUD check up    Hypothyroidism 2023    Missed  ([facility]) 2023    Multiple nevi 2023    Patellofemoral dysfunction, left 2023    Personal history of other endocrine, nutritional and metabolic disease     History of  hypothyroidism    Recurrent pregnancy loss, antepartum condition or complication ([facility])       Past Surgical History   Past Surgical History:   Procedure Laterality Date    CERVICAL BIOPSY  W/ LOOP ELECTRODE EXCISION  03/10/2014    Cervical Loop Electrosurgical Excision (LEEP)     Social History  Social History     Tobacco Use    Smoking status: Never    Smokeless tobacco: Never   Substance Use Topics    Alcohol use: Not Currently     Comment: socially     Substance and Sexual Activity   Drug Use Never     Allergies  Patient has no known allergies.   Medications  Medications Prior to Admission   Medication Sig Dispense Refill Last Dose    aspirin 81 mg EC tablet Take 1 tablet (81 mg) by mouth once daily.   8/7/2024    omeprazole (PriLOSEC) 20 mg DR capsule Take 1 capsule (20 mg) by mouth 2 times a day. Do not crush or chew. (Patient taking differently: Take 1 capsule (20 mg) by mouth once daily. Do not crush or chew.) 60 capsule 1 8/7/2024    Synthroid 150 mcg tablet TAKE 1 TABLET (150 MCG) BY MOUTH ONCE DAILY IN THE MORNING. TAKE BEFORE MEALS. 90 tablet 1 8/8/2024     Review of Systems  Please see HPI for reported pertinent positives, which would supersede this ROS    Constitutional:  Denies fever, chills, wt gain or loss, fatigue  Genito-Urinary:  Denies genital lesion or sores, vaginal dryness, itching  or pain.  No abnormal vaginal discharge or unexplained vaginal bleeding.  No dysuria, urinary incontinence or frequency.  Denies pelvic pain, dysmenorrhea or dyspareunia.  Eyes:  Denies vision changes, dryness  ENT:  No hearing loss, sinus pain or congestion, nosebleeds  Cardiovascular:  No chest pain or palpitations  Respiratory:  No SOB, cough, wheezing  GI:  No Nausea, vomiting, diarrhea, constipation, abdominal pain  Musculoskeletal:  No new back pain. joint pain, peripheral edema  Skin:  No rash or skin lesion  Neurologic:  No HA, numbness or dizziness  Psychiatric:  No new anxiety or depression  Endocrine:  " No loss of hair or hirsutism  Hematologic/lymphatic:  No swollen lymph nodes.  No reported tendency for easy bruising or bleeding    Patient admits to no other systemic complaints    Objective    Last Vitals  Temp Pulse Resp BP MAP O2 Sat   36.9 °C (98.4 °F) 75 16 131/83   99 %     Physical Examination  Physical Exam  Constitutional:       Appearance: Normal appearance.   Genitourinary:      Genitourinary Comments: Cephalic by bsus   Cardiovascular:      Rate and Rhythm: Normal rate.   Pulmonary:      Effort: Pulmonary effort is normal.   Abdominal:      Comments: gravid   Neurological:      Mental Status: She is oriented to person, place, and time.   Skin:     General: Skin is warm and dry.   Psychiatric:         Mood and Affect: Mood normal.         Behavior: Behavior normal.         Thought Content: Thought content normal.         Judgment: Judgment normal.     , mod renita, +accels, no decels  [place]:  none    Lab Review  No results found for: \"WBC\", \"HGB\", \"HCT\", \"PLT\"    Assessment   at 40w0d  Prolonged rom, not in labor  Fht cat 1  Gbs neg  hypothyroid    Plan  Admit. Ob labs.  Discuss with pt, her  and her  the recommendation for pit IOL in the setting of PROM as rom is considered to have caused cervical ripening via prostaglandin release. Pt has decided that she prefers to start with buccal cyto.  Cefm  Dr. Armas aware of admission, status and plan      "

## 2024-08-08 NOTE — SIGNIFICANT EVENT
Pt comfortable. Has felt a couple of contractions.    Fht 135, mod renita, +accels, no decels  [place]: 6-8 with some irritability  Ve def'd    Iup at 40.0  Prolonged rom  Cervical ripening via buccal cytotec per pt's preference. Plan to change to pitocin when contractions are becoming regular.  Gbs negative    Continue current course  Due for cyto #2  Cefm  Anticipate

## 2024-08-09 PROBLEM — O42.92 FULL-TERM PREMATURE RUPTURE OF MEMBRANES (HHS-HCC): Status: ACTIVE | Noted: 2024-08-08

## 2024-08-09 LAB
ALBUMIN SERPL BCP-MCNC: 2.7 G/DL (ref 3.4–5)
ALP SERPL-CCNC: 165 U/L (ref 33–110)
ALT SERPL W P-5'-P-CCNC: 17 U/L (ref 7–45)
ANION GAP SERPL CALC-SCNC: 13 MMOL/L (ref 10–20)
AST SERPL W P-5'-P-CCNC: 31 U/L (ref 9–39)
BILIRUB SERPL-MCNC: 0.4 MG/DL (ref 0–1.2)
BUN SERPL-MCNC: 11 MG/DL (ref 6–23)
CALCIUM SERPL-MCNC: 8.1 MG/DL (ref 8.6–10.3)
CHLORIDE SERPL-SCNC: 103 MMOL/L (ref 98–107)
CO2 SERPL-SCNC: 20 MMOL/L (ref 21–32)
CREAT SERPL-MCNC: 0.68 MG/DL (ref 0.5–1.05)
EGFRCR SERPLBLD CKD-EPI 2021: >90 ML/MIN/1.73M*2
ERYTHROCYTE [DISTWIDTH] IN BLOOD BY AUTOMATED COUNT: 12.5 % (ref 11.5–14.5)
GLUCOSE SERPL-MCNC: 108 MG/DL (ref 74–99)
HCT VFR BLD AUTO: 34.9 % (ref 36–46)
HGB BLD-MCNC: 11.8 G/DL (ref 12–16)
MCH RBC QN AUTO: 32 PG (ref 26–34)
MCHC RBC AUTO-ENTMCNC: 33.8 G/DL (ref 32–36)
MCV RBC AUTO: 95 FL (ref 80–100)
NRBC BLD-RTO: 0 /100 WBCS (ref 0–0)
PLATELET # BLD AUTO: 105 X10*3/UL (ref 150–450)
POTASSIUM SERPL-SCNC: 4.6 MMOL/L (ref 3.5–5.3)
PROT SERPL-MCNC: 5 G/DL (ref 6.4–8.2)
RBC # BLD AUTO: 3.69 X10*6/UL (ref 4–5.2)
SODIUM SERPL-SCNC: 131 MMOL/L (ref 136–145)
WBC # BLD AUTO: 15.3 X10*3/UL (ref 4.4–11.3)

## 2024-08-09 PROCEDURE — 10D17Z9 MANUAL EXTRACTION OF PRODUCTS OF CONCEPTION, RETAINED, VIA NATURAL OR ARTIFICIAL OPENING: ICD-10-PCS | Performed by: NURSE PRACTITIONER

## 2024-08-09 PROCEDURE — 2500000005 HC RX 250 GENERAL PHARMACY W/O HCPCS: Performed by: NURSE PRACTITIONER

## 2024-08-09 PROCEDURE — 0UQMXZZ REPAIR VULVA, EXTERNAL APPROACH: ICD-10-PCS | Performed by: NURSE PRACTITIONER

## 2024-08-09 PROCEDURE — 59414 DELIVER PLACENTA: CPT | Performed by: OBSTETRICS & GYNECOLOGY

## 2024-08-09 PROCEDURE — 2500000001 HC RX 250 WO HCPCS SELF ADMINISTERED DRUGS (ALT 637 FOR MEDICARE OP): Performed by: ADVANCED PRACTICE MIDWIFE

## 2024-08-09 PROCEDURE — 84075 ASSAY ALKALINE PHOSPHATASE: CPT | Performed by: NURSE PRACTITIONER

## 2024-08-09 PROCEDURE — 2500000001 HC RX 250 WO HCPCS SELF ADMINISTERED DRUGS (ALT 637 FOR MEDICARE OP): Performed by: NURSE PRACTITIONER

## 2024-08-09 PROCEDURE — 1100000001 HC PRIVATE ROOM DAILY

## 2024-08-09 PROCEDURE — 2500000004 HC RX 250 GENERAL PHARMACY W/ HCPCS (ALT 636 FOR OP/ED): Performed by: ADVANCED PRACTICE MIDWIFE

## 2024-08-09 PROCEDURE — 59400 OBSTETRICAL CARE: CPT | Performed by: NURSE PRACTITIONER

## 2024-08-09 PROCEDURE — 7210000002 HC LABOR PER HOUR

## 2024-08-09 PROCEDURE — 36415 COLL VENOUS BLD VENIPUNCTURE: CPT | Performed by: NURSE PRACTITIONER

## 2024-08-09 PROCEDURE — 85027 COMPLETE CBC AUTOMATED: CPT | Performed by: NURSE PRACTITIONER

## 2024-08-09 PROCEDURE — 59050 FETAL MONITOR W/REPORT: CPT

## 2024-08-09 PROCEDURE — 59409 OBSTETRICAL CARE: CPT | Performed by: NURSE PRACTITIONER

## 2024-08-09 RX ORDER — BISACODYL 10 MG/1
10 SUPPOSITORY RECTAL DAILY PRN
Status: DISCONTINUED | OUTPATIENT
Start: 2024-08-09 | End: 2024-08-11 | Stop reason: HOSPADM

## 2024-08-09 RX ORDER — ACETAMINOPHEN 325 MG/1
975 TABLET ORAL EVERY 6 HOURS
Status: DISCONTINUED | OUTPATIENT
Start: 2024-08-09 | End: 2024-08-11 | Stop reason: HOSPADM

## 2024-08-09 RX ORDER — SIMETHICONE 80 MG
80 TABLET,CHEWABLE ORAL 4 TIMES DAILY PRN
Status: DISCONTINUED | OUTPATIENT
Start: 2024-08-09 | End: 2024-08-11 | Stop reason: HOSPADM

## 2024-08-09 RX ORDER — DIPHENHYDRAMINE HCL 25 MG
25 CAPSULE ORAL EVERY 6 HOURS PRN
Status: DISCONTINUED | OUTPATIENT
Start: 2024-08-09 | End: 2024-08-11 | Stop reason: HOSPADM

## 2024-08-09 RX ORDER — IBUPROFEN 600 MG/1
600 TABLET ORAL EVERY 6 HOURS
Status: DISCONTINUED | OUTPATIENT
Start: 2024-08-09 | End: 2024-08-11 | Stop reason: HOSPADM

## 2024-08-09 RX ORDER — ADHESIVE BANDAGE
10 BANDAGE TOPICAL
Status: DISCONTINUED | OUTPATIENT
Start: 2024-08-09 | End: 2024-08-11 | Stop reason: HOSPADM

## 2024-08-09 RX ORDER — METHYLERGONOVINE MALEATE 0.2 MG/ML
0.2 INJECTION INTRAVENOUS ONCE AS NEEDED
Status: DISCONTINUED | OUTPATIENT
Start: 2024-08-09 | End: 2024-08-11 | Stop reason: HOSPADM

## 2024-08-09 RX ORDER — OXYTOCIN 10 [USP'U]/ML
10 INJECTION, SOLUTION INTRAMUSCULAR; INTRAVENOUS ONCE AS NEEDED
Status: DISCONTINUED | OUTPATIENT
Start: 2024-08-09 | End: 2024-08-11 | Stop reason: HOSPADM

## 2024-08-09 RX ORDER — ONDANSETRON 4 MG/1
4 TABLET, FILM COATED ORAL EVERY 6 HOURS PRN
Status: DISCONTINUED | OUTPATIENT
Start: 2024-08-09 | End: 2024-08-11 | Stop reason: HOSPADM

## 2024-08-09 RX ORDER — FENTANYL/ROPIVACAINE/NS/PF 2MCG/ML-.2
0-25 PLASTIC BAG, INJECTION (ML) INJECTION CONTINUOUS
Status: DISCONTINUED | OUTPATIENT
Start: 2024-08-09 | End: 2024-08-09

## 2024-08-09 RX ORDER — TRANEXAMIC ACID 100 MG/ML
1000 INJECTION, SOLUTION INTRAVENOUS ONCE AS NEEDED
Status: DISCONTINUED | OUTPATIENT
Start: 2024-08-09 | End: 2024-08-11 | Stop reason: HOSPADM

## 2024-08-09 RX ORDER — LABETALOL HYDROCHLORIDE 5 MG/ML
20 INJECTION, SOLUTION INTRAVENOUS ONCE AS NEEDED
Status: DISCONTINUED | OUTPATIENT
Start: 2024-08-09 | End: 2024-08-11 | Stop reason: HOSPADM

## 2024-08-09 RX ORDER — ALUMINUM HYDROXIDE, MAGNESIUM HYDROXIDE, AND SIMETHICONE 1200; 120; 1200 MG/30ML; MG/30ML; MG/30ML
20 SUSPENSION ORAL 4 TIMES DAILY PRN
Status: DISCONTINUED | OUTPATIENT
Start: 2024-08-09 | End: 2024-08-11 | Stop reason: HOSPADM

## 2024-08-09 RX ORDER — HYDRALAZINE HYDROCHLORIDE 20 MG/ML
5 INJECTION INTRAMUSCULAR; INTRAVENOUS ONCE AS NEEDED
Status: DISCONTINUED | OUTPATIENT
Start: 2024-08-09 | End: 2024-08-11 | Stop reason: HOSPADM

## 2024-08-09 RX ORDER — NIFEDIPINE 10 MG/1
10 CAPSULE ORAL ONCE AS NEEDED
Status: DISCONTINUED | OUTPATIENT
Start: 2024-08-09 | End: 2024-08-11 | Stop reason: HOSPADM

## 2024-08-09 RX ORDER — OXYTOCIN/0.9 % SODIUM CHLORIDE 30/500 ML
60 PLASTIC BAG, INJECTION (ML) INTRAVENOUS ONCE AS NEEDED
Status: DISCONTINUED | OUTPATIENT
Start: 2024-08-09 | End: 2024-08-11 | Stop reason: HOSPADM

## 2024-08-09 RX ORDER — CARBOPROST TROMETHAMINE 250 UG/ML
250 INJECTION, SOLUTION INTRAMUSCULAR ONCE AS NEEDED
Status: DISCONTINUED | OUTPATIENT
Start: 2024-08-09 | End: 2024-08-11 | Stop reason: HOSPADM

## 2024-08-09 RX ORDER — POLYETHYLENE GLYCOL 3350 17 G/17G
17 POWDER, FOR SOLUTION ORAL 2 TIMES DAILY PRN
Status: DISCONTINUED | OUTPATIENT
Start: 2024-08-09 | End: 2024-08-11 | Stop reason: HOSPADM

## 2024-08-09 RX ORDER — LEVOTHYROXINE SODIUM 75 UG/1
150 TABLET ORAL DAILY
Status: DISCONTINUED | OUTPATIENT
Start: 2024-08-09 | End: 2024-08-11 | Stop reason: HOSPADM

## 2024-08-09 RX ORDER — ONDANSETRON HYDROCHLORIDE 2 MG/ML
4 INJECTION, SOLUTION INTRAVENOUS EVERY 6 HOURS PRN
Status: DISCONTINUED | OUTPATIENT
Start: 2024-08-09 | End: 2024-08-11 | Stop reason: HOSPADM

## 2024-08-09 RX ORDER — LIDOCAINE 560 MG/1
1 PATCH PERCUTANEOUS; TOPICAL; TRANSDERMAL
Status: DISCONTINUED | OUTPATIENT
Start: 2024-08-09 | End: 2024-08-11 | Stop reason: HOSPADM

## 2024-08-09 RX ORDER — LOPERAMIDE HYDROCHLORIDE 2 MG/1
4 CAPSULE ORAL EVERY 2 HOUR PRN
Status: DISCONTINUED | OUTPATIENT
Start: 2024-08-09 | End: 2024-08-11 | Stop reason: HOSPADM

## 2024-08-09 RX ORDER — MISOPROSTOL 200 UG/1
800 TABLET ORAL ONCE AS NEEDED
Status: DISCONTINUED | OUTPATIENT
Start: 2024-08-09 | End: 2024-08-11 | Stop reason: HOSPADM

## 2024-08-09 RX ORDER — DIPHENHYDRAMINE HYDROCHLORIDE 50 MG/ML
25 INJECTION INTRAMUSCULAR; INTRAVENOUS EVERY 6 HOURS PRN
Status: DISCONTINUED | OUTPATIENT
Start: 2024-08-09 | End: 2024-08-11 | Stop reason: HOSPADM

## 2024-08-09 SDOH — SOCIAL STABILITY: SOCIAL INSECURITY: DO YOU FEEL ANYONE HAS EXPLOITED OR TAKEN ADVANTAGE OF YOU FINANCIALLY OR OF YOUR PERSONAL PROPERTY?: NO

## 2024-08-09 SDOH — HEALTH STABILITY: MENTAL HEALTH: HAVE YOU USED ANY PRESCRIPTION DRUGS OTHER THAN PRESCRIBED IN THE PAST 12 MONTHS?: NO

## 2024-08-09 SDOH — HEALTH STABILITY: MENTAL HEALTH: HOW OFTEN DO YOU HAVE A DRINK CONTAINING ALCOHOL?: NEVER

## 2024-08-09 SDOH — ECONOMIC STABILITY: HOUSING INSECURITY: DO YOU FEEL UNSAFE GOING BACK TO THE PLACE WHERE YOU ARE LIVING?: NO

## 2024-08-09 SDOH — HEALTH STABILITY: MENTAL HEALTH: WISH TO BE DEAD (PAST 1 MONTH): NO

## 2024-08-09 SDOH — SOCIAL STABILITY: SOCIAL INSECURITY: ARE THERE ANY APPARENT SIGNS OF INJURIES/BEHAVIORS THAT COULD BE RELATED TO ABUSE/NEGLECT?: NO

## 2024-08-09 SDOH — HEALTH STABILITY: MENTAL HEALTH: SUICIDAL BEHAVIOR (LIFETIME): NO

## 2024-08-09 SDOH — SOCIAL STABILITY: SOCIAL INSECURITY: HAVE YOU HAD ANY THOUGHTS OF HARMING ANYONE ELSE?: NO

## 2024-08-09 SDOH — SOCIAL STABILITY: SOCIAL INSECURITY: VERBAL ABUSE: DENIES

## 2024-08-09 SDOH — SOCIAL STABILITY: SOCIAL INSECURITY: DOES ANYONE TRY TO KEEP YOU FROM HAVING/CONTACTING OTHER FRIENDS OR DOING THINGS OUTSIDE YOUR HOME?: NO

## 2024-08-09 SDOH — HEALTH STABILITY: MENTAL HEALTH: HOW OFTEN DO YOU HAVE 6 OR MORE DRINKS ON ONE OCCASION?: NEVER

## 2024-08-09 SDOH — HEALTH STABILITY: MENTAL HEALTH: NON-SPECIFIC ACTIVE SUICIDAL THOUGHTS (PAST 1 MONTH): NO

## 2024-08-09 SDOH — HEALTH STABILITY: MENTAL HEALTH: HOW MANY STANDARD DRINKS CONTAINING ALCOHOL DO YOU HAVE ON A TYPICAL DAY?: PATIENT DOES NOT DRINK

## 2024-08-09 SDOH — SOCIAL STABILITY: SOCIAL INSECURITY: PHYSICAL ABUSE: DENIES

## 2024-08-09 SDOH — HEALTH STABILITY: MENTAL HEALTH: WERE YOU ABLE TO COMPLETE ALL THE BEHAVIORAL HEALTH SCREENINGS?: YES

## 2024-08-09 SDOH — HEALTH STABILITY: MENTAL HEALTH: HAVE YOU USED ANY SUBSTANCES (CANABIS, COCAINE, HEROIN, HALLUCINOGENS, INHALANTS, ETC.) IN THE PAST 12 MONTHS?: NO

## 2024-08-09 SDOH — SOCIAL STABILITY: SOCIAL INSECURITY: HAS ANYONE EVER THREATENED TO HURT YOUR FAMILY OR YOUR PETS?: NO

## 2024-08-09 SDOH — SOCIAL STABILITY: SOCIAL INSECURITY: HAVE YOU HAD THOUGHTS OF HARMING ANYONE ELSE?: NO

## 2024-08-09 SDOH — SOCIAL STABILITY: SOCIAL INSECURITY: ARE YOU OR HAVE YOU BEEN THREATENED OR ABUSED PHYSICALLY, EMOTIONALLY, OR SEXUALLY BY ANYONE?: NO

## 2024-08-09 ASSESSMENT — LIFESTYLE VARIABLES
AUDIT-C TOTAL SCORE: 0
SKIP TO QUESTIONS 9-10: 1
SKIP TO QUESTIONS 9-10: 1
HOW OFTEN DO YOU HAVE 6 OR MORE DRINKS ON ONE OCCASION: NEVER
HOW MANY STANDARD DRINKS CONTAINING ALCOHOL DO YOU HAVE ON A TYPICAL DAY: PATIENT DOES NOT DRINK
AUDIT-C TOTAL SCORE: 0
HOW OFTEN DO YOU HAVE A DRINK CONTAINING ALCOHOL: NEVER
AUDIT-C TOTAL SCORE: 0

## 2024-08-09 ASSESSMENT — PAIN SCALES - GENERAL
PAINLEVEL_OUTOF10: 3
PAINLEVEL_OUTOF10: 0 - NO PAIN
PAINLEVEL_OUTOF10: 3
PAINLEVEL_OUTOF10: 2
PAINLEVEL_OUTOF10: 3
PAINLEVEL_OUTOF10: 3

## 2024-08-09 ASSESSMENT — PATIENT HEALTH QUESTIONNAIRE - PHQ9
SUM OF ALL RESPONSES TO PHQ9 QUESTIONS 1 & 2: 0
1. LITTLE INTEREST OR PLEASURE IN DOING THINGS: NOT AT ALL
2. FEELING DOWN, DEPRESSED OR HOPELESS: NOT AT ALL

## 2024-08-09 ASSESSMENT — ACTIVITIES OF DAILY LIVING (ADL): LACK_OF_TRANSPORTATION: NO

## 2024-08-09 NOTE — PROGRESS NOTES
Pt comfortable. Desires to eat and rest, agreeable to continued buccal cyto plan.      Fht 130, mod renita, +accels, no decels  [place]: every 2-5 minutes   SVE deferred      Iup at 40.0  Prolonged rom  Cervical ripening via buccal cytotec per pt's preference. Plan to change to pitocin when contractions are becoming regular.  Gbs negative     Continue current course  Due for cyto #3  CEFM  Anticipate   Birth plan reviewed, Dr. Beltran and peds updated on prolonged SROM and pt status    Mary Jane Myers, APRN-CNM, APRN-CNP

## 2024-08-09 NOTE — L&D DELIVERY NOTE
OB Delivery Note  2024  Marisa Hopkins  38 y.o.   Vaginal, Spontaneous     Present with the patient and pushing efforts as she was pushing well with position changes. Head was delivered, assisted restitution, right anterior shoulder not released with gentle traction. Shoulder called and a code pink. Pt placed in the [place] and the anterior shoulder released with downward traction. Posterior shoulder then delivered with upper gallego traction. Head to body, per RN, 10 seconds. A viable baby girl was delivered and placed on mothers abdomen and stimulated to cry. Baby was stable and we awaited delayed cord clamping however, with an increased gush of bleeding I asked to cut the cord and place the baby skin to skin and Marisa and father were agreeable. When to cord was done pulsating the cord was double clamp and the support person, Jatin was assisted in cutting the cord between the clamps. The baby was then brought to mom's chest and placed skin to skin. The perineum was inspected to be intact with a periurethral lac and small vaginal lacs on the right vagina and a small lac posterior to the introitus, all repaired with 3-0 vicryl. A franklin cath was placed for the periurethral tear with the assistance of Dr. Beltran, visualization of the tear to assess the lac. The franklin was removed after the repair completed. Dr. Beltran present and completed the manual placenta removal, placenta examined and intact.     The mom and baby were left in stable condition to bond.        Gestational Age: 40w1d  /Para:   Quantitative Blood Loss: Admission to Discharge: 500 mL (2024  2:29 PM - 2024  5:32 AM)    Silvino Hopkins [[id number]]      Labor Events    Rupture date/time: 2024  Rupture type: Spontaneous, Prelabor  Fluid color: Clear  Fluid odor: None       Placenta    Placenta delivery date/time: 2024 0422  Placenta removal: Manual removal  Placenta appearance: Intact  Placenta disposition: discarded        Cord    Vessels: 3 vessels  Complications: None  Delayed cord clamping?: Yes  Cord blood disposition: Lab  Gases sent?: No  Stem cell collection (by provider): No       Lacerations    Episiotomy: None  Perineal laceration: None  Periurethral laceration?: Yes  Periurethral laceration location: left  Periurethral laceration repaired?: Yes  Vaginal laceration?: Yes  Vaginal laceration location: right  Vaginal laceration repaired?: Yes  Repair suture: 3-0 Synthetic Suture       Anesthesia    Method: None       Operative Delivery    Forceps attempted?: No  Vacuum extractor attempted?: No       Shoulder Dystocia    Shoulder dystocia present?: Yes  Anterior shoulder: right  Gentle attempt at traction, assisted by maternal expulsive forces?: Yes  First maneuver: [place] maneuver  First maneuver performed by: Maria Esther REGALADO, shoulder lasted for 30 seconds, able to release with [place]       [place] Delivery    Birth date/time: 2024 03:47:00  Delivery type: Vaginal, Spontaneous       Resuscitation    Method: Tactile stimulation, Suctioning       Apgars    Living status: Living  Apgar Component Scores:  1 min.:  5 min.:  10 min.:  15 min.:  20 min.:    Skin color:  1  1       Heart rate:  2  2       Reflex irritability:  2  2       Muscle tone:  2  2       Respiratory effort:  2  2       Total:  9  9       Apgars assigned by: VIC RUSSELL RN AND VALERIE LAGOS RN       Delivery Providers    Delivering clinician: JODY Olvera, APRN-CNP   Provider Role    Valerie London, AMANDA Delivery Nurse    Vic Vogel, RN Nursery Nurse     Resident                 Intrauterine Device Inserted : No, NCB    JODY Olvera, JACKSON

## 2024-08-09 NOTE — PROGRESS NOTES
Pt pushing well, episode of fetal bradycardia around 0240, FHR in the 90s.  at bedside with  for labor support     Fht 115, minimal renita, +accels, no decels  Decel to the 90s with maternal repositioning and resolution of decel  [place]: every 1-2 minutes   10/100/0     Iup at 40.0  Prolonged rom  Active Labor  GBS negative     Continue current course  CEFM  Anticipate   Nitrous offered, at beside if needed       Mary Jane Myers, APRN-CNM, APRN-CNP

## 2024-08-09 NOTE — SIGNIFICANT EVENT
Asked by midwife to evaluate placenta  30 minutes after delivery, placenta in place    Exam revealed lower edge of placenta outside of cervix  Gentle downward traction revealed attachment to anterior uterus    Continued gentle downward traction delivered the placenta intact  Pt tolerated well    Assisted in left periurethral repair

## 2024-08-09 NOTE — PROGRESS NOTES
Pt feeling the urge to push. Agreeable to SVE.  and  at bedside.      Fht 115, minimal renita, +accels, no decels  [place]: every 1-2 minutes   10/100/0     Iup at 40.0  Prolonged rom  Active Labor  Cervical ripening via buccal cytotec per pt's preference. Plan to change to pitocin when contractions are becoming regular.  GBS negative       Continue current course  CEFM  Anticipate    at bedside with  for labor support  Nitrous offered, at beside if needed       Mary Jane Myers, APRN-CNM, APRN-CNP

## 2024-08-09 NOTE — DISCHARGE INSTRUCTIONS
Any woman can have complications after the birth of a baby including a blood clot, a heart problem, hypertensive disorder/eclampsia, depression, hemorrhage, or infection. Notify all providers of your delivery date up to one year after birth.*       Call 911 or go to nearest emergency room right away if you have: PAIN or pressure in chest; OBSTRUCTED breathing or shortness of breath; SEIZURES; THOUGHTS of hurting yourself or your baby; heart palpitations/racing; change in alertness/confusion.    Call your provider if you have: BLEEDING, soaking through a pad/hour, or blood clots the size of an egg or bigger; INCISION (episiotomy stitches or  site) that is not healing (increased redness, pain, drainage/pus, or separation); RED or swollen leg/calf that is painful or warm to touch, especially in one leg more than the other; TEMPERATURE of 100.4 F or higher or chills; HEADACHE that does not get better with medicine, rest or hydration, or bad headache with vision changes like spots or flashing lights; increased swelling of face, hands or legs; severe cramps or upper right belly pain; red or swollen breast that is painful or warm to touch; an unusual, foul odor from your vaginal discharge; pain, burning, or difficulty during urination; severe constipation (more than 5 days); feelings of depression (such as depressed mood, loss of interest in enjoyable things, unable to care for yourself, trouble sleeping, lack of appetite, or feeling worthless).     If you can’t reach your provider or symptoms worsen, call 911 or go to nearest emergency room.   *Information obtained from [facility]’s: Save Your Life: Get Care for These POST-BIRTH Warning Signs“The American Academy of Pediatrics recommends that pacifier use is best avoided during the initiation of breastfeeding and used only after breastfeeding is well established.  In some infants, early pacifier use may interfere with establishment of good breastfeeding practices,  whereas in others it may indicate the presence of a breastfeeding problem that requires intervention.  Use of a pacifier may cause problems with latching, and lead to decreased milk supply by missing feeding opportunities.  Pacifiers may be used during painful procedures, but are not otherwise recommended while the infant is learning to breastfeed.”

## 2024-08-09 NOTE — PROGRESS NOTES
Pt comfortable. Desires to eat and rest, agreeable to continued buccal cyto plan.      Fht 130, minimal renita, +accels, no decels  [place]: every 1-2 minutes   8/100/-1     Iup at 40.0  Prolonged rom  Active Labor  Cervical ripening via buccal cytotec per pt's preference. Plan to change to pitocin when contractions are becoming regular.  GBS negative       Continue current course  CEFM  Anticipate    at bedside with  for labor support  Nitrous offered, at beside if needed   Dr. Beltran updated at this time of progression  500cc IV bolus accepted by the pt due to close spacing contractions/Tachysystole    Mary Jane Myers, APRN-CNM, APRN-CNP

## 2024-08-10 PROCEDURE — 2500000001 HC RX 250 WO HCPCS SELF ADMINISTERED DRUGS (ALT 637 FOR MEDICARE OP): Performed by: ADVANCED PRACTICE MIDWIFE

## 2024-08-10 PROCEDURE — 1100000001 HC PRIVATE ROOM DAILY

## 2024-08-10 PROCEDURE — 2500000001 HC RX 250 WO HCPCS SELF ADMINISTERED DRUGS (ALT 637 FOR MEDICARE OP): Performed by: NURSE PRACTITIONER

## 2024-08-10 RX ORDER — IBUPROFEN 600 MG/1
600 TABLET ORAL EVERY 6 HOURS
Qty: 20 TABLET | Refills: 0 | Status: SHIPPED | OUTPATIENT
Start: 2024-08-10

## 2024-08-10 ASSESSMENT — PAIN SCALES - GENERAL
PAINLEVEL_OUTOF10: 2
PAINLEVEL_OUTOF10: 1
PAINLEVEL_OUTOF10: 0 - NO PAIN
PAINLEVEL_OUTOF10: 0 - NO PAIN
PAINLEVEL_OUTOF10: 2

## 2024-08-10 NOTE — CARE PLAN
The patient's goals for the shift include feeding and bonding    The clinical goals for the shift include bond with baby    Over the shift, the patient did not make progress toward the following goals. Barriers to progression include ongoing. Recommendations to address these barriers include ongoing.

## 2024-08-10 NOTE — LACTATION NOTE
Lactation Consultant Note  Lactation Consultation  Reason for Consult: Initial assessment  Consultant Name: Teagan DALEY    Maternal Information  Has mother  before?: No  Infant to breast within first 2 hours of birth?: Yes  Exclusive Pump and Bottle Feed: No    Maternal Assessment  Breast Assessment: Large, Soft, Compressible  Nipple Assessment: Intact, Erect  Areola Assessment: Normal    Infant Assessment  Infant Behavior: Awake, Fussy  Infant Assessment: Good cupping of tongue, Good lateral movement of tongue    Feeding Assessment  Nutrition Source: Breastmilk  Feeding Method: Nursing at the breast  Feeding Position: Cradle, C - hold  Suck/Feeding: Sustained (brief feed /sleepy)  Latch Assessment: Minimal assistance is needed, Instructed on deep latch, Sucks with long jaw movement, Chin moves in rhythmic motion, Lower lip turned in    LATCH TOOL  Latch: Grasps breast, tongue down, lips flanged, rhythmic sucking  Audible Swallowing: A few with stimulation  Type of Nipple: Everted (After stimulation)  Comfort (Breast/Nipple): Soft/non-tender  Hold (Positioning): Minimal assist, teach one side, mother does other, staff holds  LATCH Score: 8    Breast Pump       Other OB Lactation Tools       Patient Follow-up  Lactation Professional - OK to Discharge: Yes    Other OB Lactation Documentation  Maternal Risk Factors: Age over 30, primiparity    Recommendations/Summary  Observed latch. Baby latched readily to the left side and is able to sustain latch sucking with long jaw movement. Baby fed 1 hour prior to my visit. Reviewed milk supply patterns and  feeding patterns in the fist and second 24 HOL.Mom was asked to attempt/feed baby at least every 2-3 hours or on demand with a goal of 8-12 feeds daily. Feeding cues reviewed.Breastfeeding education reviewed and questions answered. Mom aware of lactation support and asked to call out for feed assistance and with questions as needed.

## 2024-08-10 NOTE — PROGRESS NOTES
Postpartum Progress Note    Assessment/Plan   Marisa Hopkins is a 38 y.o., , who delivered at 40w1d gestation and is now postpartum day 1.  Anticipate discharge home tomorrow    Principal Problem:    Full-term premature rupture of membranes ([facility])  Active Problems:    Advanced maternal age, primigravida, antepartum ([facility])    Prolonged spontaneous rupture of membranes ([facility])    Vaginal delivery ([facility])    Shoulder dystocia during labor and delivery ([facility])    pregnancy Problems (from 24 to present)       Problem Noted Resolved    Shoulder dystocia during labor and delivery ([facility]) 2024 by Mary Jane Myers, APRN-CNM, APRN-CNP No    Priority:  Medium      Full-term premature rupture of membranes ([facility]) 2024 by Karis Murillo, APRN-CNM No    Priority:  Medium      Prolonged spontaneous rupture of membranes ([facility]) 2024 by Michelle Armas, DO No    Priority:  Medium      Pregnancy with 39 completed weeks gestation ([facility]) 2024 by Michelle Armas, DO No    Priority:  Medium            Hospital course: no complications  A+ blood type  breastfeeding    Subjective   Her pain is well controlled with current medications  She is passing flatus  She is ambulating well  She is tolerating a Adult diet Regular  She reports no breast or nursing problems  She denies emotional concerns today   Her plan for contraception is none       Objective   Allergies:   Patient has no known allergies.         Last Vitals:  Temp Pulse Resp BP MAP Pulse Ox   36.8 °C (98.2 °F) 62 18 122/71 81 98 %     Vitals Min/Max Last 24 Hours:  Temp  Min: 36.3 °C (97.3 °F)  Max: 37.3 °C (99.1 °F)  Pulse  Min: 50  Max: 66  Resp  Min: 16  Max: 18  BP  Min: 109/61  Max: 133/70  MAP (mmHg)  Min: 78  Max: 96    Intake/Output:     Intake/Output Summary (Last 24 hours) at 8/10/2024 0921  Last data filed at 2024 1210  Gross per 24 hour   Intake --   Output 350 ml   Net -350 ml       Physical Exam:  General:  Examination reveals a well developed, well nourished, female, in no acute distress. She is alert and cooperative.  Abdomen: soft, NT.  Fundus: firm, below umbilicus, and nontender.  Extremities: no redness or tenderness in the calves or thighs, edema pedal 1+.    Lab Data:  Labs in chart were reviewed.

## 2024-08-11 VITALS
HEART RATE: 53 BPM | WEIGHT: 154 LBS | TEMPERATURE: 98.2 F | OXYGEN SATURATION: 99 % | HEIGHT: 64 IN | DIASTOLIC BLOOD PRESSURE: 82 MMHG | SYSTOLIC BLOOD PRESSURE: 129 MMHG | BODY MASS INDEX: 26.29 KG/M2 | RESPIRATION RATE: 16 BRPM

## 2024-08-11 PROBLEM — O42.90: Status: RESOLVED | Noted: 2024-08-07 | Resolved: 2024-08-11

## 2024-08-11 PROBLEM — O09.519 ADVANCED MATERNAL AGE, PRIMIGRAVIDA, ANTEPARTUM (HHS-HCC): Status: RESOLVED | Noted: 2024-01-02 | Resolved: 2024-08-11

## 2024-08-11 PROBLEM — O42.92 FULL-TERM PREMATURE RUPTURE OF MEMBRANES (HHS-HCC): Status: RESOLVED | Noted: 2024-08-08 | Resolved: 2024-08-11

## 2024-08-11 PROCEDURE — 2500000001 HC RX 250 WO HCPCS SELF ADMINISTERED DRUGS (ALT 637 FOR MEDICARE OP): Performed by: NURSE PRACTITIONER

## 2024-08-11 PROCEDURE — 2500000001 HC RX 250 WO HCPCS SELF ADMINISTERED DRUGS (ALT 637 FOR MEDICARE OP): Performed by: ADVANCED PRACTICE MIDWIFE

## 2024-08-11 RX ORDER — ACETAMINOPHEN 325 MG/1
975 TABLET ORAL EVERY 6 HOURS
Qty: 360 TABLET | Refills: 0 | Status: SHIPPED | OUTPATIENT
Start: 2024-08-11 | End: 2024-09-10

## 2024-08-11 ASSESSMENT — PAIN DESCRIPTION - LOCATION: LOCATION: PERINEUM

## 2024-08-11 ASSESSMENT — PAIN SCALES - GENERAL
PAINLEVEL_OUTOF10: 2
PAINLEVEL_OUTOF10: 2
PAINLEVEL_OUTOF10: 3

## 2024-08-11 NOTE — CARE PLAN
The patient's goals for the shift include resting/infant care/bonding    The clinical goals for the shift include free from injury      Problem: Postpartum  Goal: Experiences normal postpartum course  Outcome: Met  Goal: Appropriate maternal -  bonding  Outcome: Met  Goal: Establish and maintain infant feeding pattern for adequate nutrition  Outcome: Met  Goal: Incisions, wounds, or drain sites healing without S/S of infection  Outcome: Met  Goal: No s/sx infection  Outcome: Met  Goal: No s/sx of hemorrhage  Outcome: Met  Goal: Minimal s/sx of HDP and BP<160/110  Outcome: Met     Problem: Hypertensive Disorder of Pregnancy (HDP)  Goal: Minimal s/sx of HDP and BP<160/110  Outcome: Met  Goal: Adequate urine output (0.5 ml/kg/hr)  Outcome: Met

## 2024-08-11 NOTE — DISCHARGE SUMMARY
Discharge Summary    Admission Date: 8/8/2024  Discharge Date: 8/11/2024      Discharge Diagnosis  Full-term premature rupture of membranes ([facility])  Shoulder dystocia  Prolonged rupture of membranes  Spontaneous vaginal delivery    Hospital Course  Delivery Date: 8/9/2024 3:47 AM  Delivery type: Vaginal, Spontaneous   GA at delivery: 40w1d  Outcome: Living  Anesthesia during delivery: None  Intrapartum complications:    Feeding method: Breastfeeding Status: Yes     Procedures: none  Contraception at discharge: none      Pertinent Physical Exam At Time of Discharge    General: Examination reveals a well developed, well nourished, female, in no acute distress. She is alert and cooperative.  Fundus: firm, below umbilicus, and nontender.  Perineum: well approximated.  Psychological: awake and alert; oriented to person, place, and time.    Last Vitals:  Temp Pulse Resp BP MAP Pulse Ox   36.8 °C (98.2 °F) (!) 53 16 129/82 101 99 %     Discharge Meds     Your medication list        START taking these medications        Instructions Last Dose Given Next Dose Due   acetaminophen 325 mg tablet  Commonly known as: Tylenol      Take 3 tablets (975 mg) by mouth every 6 hours.       ibuprofen 600 mg tablet      Take 1 tablet (600 mg) by mouth every 6 hours.              CONTINUE taking these medications        Instructions Last Dose Given Next Dose Due   Synthroid 150 mcg tablet  Generic drug: levothyroxine      TAKE 1 TABLET (150 MCG) BY MOUTH ONCE DAILY IN THE MORNING. TAKE BEFORE MEALS.              STOP taking these medications      aspirin 81 mg EC tablet        omeprazole 20 mg DR capsule  Commonly known as: PriLOSEC                  Where to Get Your Medications        These medications were sent to [facility]pharmacy #[id number] - [place] - [address]   [address] [place], [place] [address]      Hours: 24-hours Phone: [phone]   acetaminophen 325 mg tablet  ibuprofen 600 mg tablet           Complications Requiring Follow-Up  none    Test Results Pending At Discharge  Pending Labs       No current pending labs.            Outpatient Follow-Up  Future Appointments   Date Time Provider Department Center   9/12/2024  1:45 PM Renzo Jeffries MD [id number]       I spent 30 minutes in the professional and overall care of this patient.      Tamie Flores, ASHLIE-LEBRON

## 2024-08-13 ENCOUNTER — TELEPHONE (OUTPATIENT)
Dept: LACTATION | Facility: CLINIC | Age: 39
End: 2024-08-13
Payer: COMMERCIAL

## 2024-08-13 ENCOUNTER — TELEPHONE (OUTPATIENT)
Dept: OBSTETRICS AND GYNECOLOGY | Facility: CLINIC | Age: 39
End: 2024-08-13
Payer: COMMERCIAL

## 2024-08-13 NOTE — TELEPHONE ENCOUNTER
Called patient to discuss incontinence issues  Identified by name and   Delivered    Whenever she feels like she has to urinate, can't hold it, cannot stop once she starts going  Doesn't feel like she noticed this initially but since she has been home has noticed   Did have a small tear near urethra, has stitch in place  Informed her this can be common in patients after pregnancy, she can start to do Kegel exercises and PFPT exercises to help re-strengthen those muscles.   Will forward to provider to see what is advised and if PFPT or Urogyn consult is appropriate  Patient verbalized understanding, all questions/concerns addressed at this time.    Nancy Andrews, ANGELN RN

## 2024-08-14 ENCOUNTER — LACTATION CONSULT (OUTPATIENT)
Dept: LACTATION | Facility: CLINIC | Age: 39
End: 2024-08-14
Payer: COMMERCIAL

## 2024-08-14 DIAGNOSIS — O92.70 LACTATION DISORDER (HHS-HCC): Primary | ICD-10-CM

## 2024-08-14 PROCEDURE — 99211 OFF/OP EST MAY X REQ PHY/QHP: CPT | Performed by: OBSTETRICS & GYNECOLOGY

## 2024-08-14 ASSESSMENT — ENCOUNTER SYMPTOMS
DEPRESSION: 0
LOSS OF SENSATION IN FEET: 0
OCCASIONAL FEELINGS OF UNSTEADINESS: 0

## 2024-08-14 NOTE — PROGRESS NOTES
Lactation Counseling Note    Subjective:    Marisa Hopkins is a 38 y.o. patient referred for lactation counseling. She is here today due to Low milk supply, Breast pumping concerns/issues, and Latch issues. She was referred by her  self .     OB HISTORY:   Healthcare Provider: [facility]  Prenatal Screening: Negative  History of Hashimoto's, celiac  Maternal Blood Type: A positive  /Para: : 3  Para: 1  Weeks Gestation: 40 1/7  Mode of Delivery: Normal vaginal route  Induction/Augmentation  Shoulder dystocia, prolonged rupture of memebranes  Delivery Complications: Fetal macrosomia  Prolonged ROM  Shoulder dystocia  Maternal Complications: None  [place] Information: Baby's Name: Violette Huff  : 24  MRN: [id number]  Place of Birth: [facility]  Healthcare Provider: Marina Rose  APGARS: 1 minute: 9  5 minutes: 9  Skin to skin contact: First hour  Infant's blood type: not tested  Birth parameters: AGA  Birth weight: 3968 gm  Birth length: 53 cm  Discharge weight: 3629 gm  Complications: none  No other concerns    Objective:    BREASTFEEDING ASSESSMENT:   Breast Assessment: Medium, Symmetrical, Filling, Soft, and Compressible  Nipple Assessment/Stage: intact, erect, and distorted shape or flattened nontender  Areola: Normal  Feeding Position: breast sandwich, cross - cradle, both sides, nipple to nose, mother needs assistance with latch/positioning, and baby's head too high over breast  Latch: minimal assistance is needed, instructed on deep latch, shallow latch, mouth not open wide enough, upper lip turned in, lower lip turned in, flanged lips, chin moves in rhythmic motion, clenched jaws, comfortable latch, and minimal audible swallowing  Suck/Feeding: sustained, coordinated suck/swallow/breathe, non-nutritional sucking, cheeks dimple during sucking, and supplemented breast  Alternative Feeding Methods: nursing at the breast, feeding expressed breast milk, and paced bottle  Feeding and Cleaning  instructions reviewed for: Paced bottle  Infant Feeding Method: Breast milk and Formula  Infant Behavior: sleepy, fussy, readiness to feed, feeding cues observed, rooting response, and suckles on and off, needs stimulation  Infant Information: Palate- high/arch/bubble/normal  Poor occlusion of lips around areola  Tongue protrudes over alveolar ridge  Tongue humped/bunched/retracted/elevated  Good cupping of tongue  Fontanel: flat  Mucous Membranes: moist  No other concerns  Breast Pain: Pain scale 0-10 (10 most pain): 0  Nipple Pain: Pain scale 0-10 (10 most pain): 0  Pain description: sore  Before feeding pain 0-10 (10 most pain): 3-4  After adjustment pain 0-10 (10 most pain): 0  Other pain relief methods: earth mama  No other concerns  Weight: Reported pediatrician visit weight: first visit will be later today  Date of pediatrician weight: later today  Weight before feedin gm  Weight after feedin gm  Amount of breast milk transferred: 8 ml  Number of voids in the last 24 hours: 4  Number of stools in the last 24 hours: 5  Infant took 1 oz of expressed milk via bottle        PATIENT DISCUSSION SUMMARY:  .  .  Mother and infant seen for concerns over latch and milk transfer.  Infant not satisfied after feedings and has supplemented with formula x 3 and expressed milk via bottle.    Mother has been feeding on demand.   Mother's milk in and milk easily expressible.    Infant weight loss now 9.3%.  Infant sleepy with moist mucous membranes.  Messy diaper in office.    Mother's nipples erect and breasts filling. Infant able to sustain latch.  Latch shallow, even with deepest latch achieved. Mother reports latch as comfortable and nipples slightly flattened after feeding. No audible swallows and infant only transferred 8 ml between both breasts.    Suck assessment abnormal: reveals infant with tight maxillary frenulum, and curls upper lip, but lip able to be everted.  Infant with good cupping and extension  of tongue, with posterior humping of tongue, and moderate lateralization at this exam.  Infant not noted to elevate tongue past mid mouth on this exam. I Palate high and intact to palpation.    Discussed to bottle feed with more upright positioning and brandi lips to help infant have wide latch when using bottle by pulling down on chin and everting upper lip.    Mother instructed on suck training exercises.    Pumping session observed with Inv[employer] S1     pump with   24   mm flanges.  Recommend slightly smaller flanges for improved fit.  Discussed increasing vacuum to comfort, use of massage and compression, and hand expression after pumping to improve breast emptying.  Mother shown technique for hand expression. Pumping out approximately 50  ml in 20   minutes. Milk volume is appropriate for day 5.    Reviewed post birth warning signs and safe sleep    Plan:  Mother to do suck training exercises prior to every feed.  Feed at least 8-12 times daily, both breasts for as long as infant is actively sucking and swallowing.  Limit to 10 minutes on one breast unless hearing active swallowing. Use massage and compression to aid transfer.      At least 8 times daily, mother to pump both breasts at same time for 20 minutes, using massage and compression, with hand expression after to fully drain breast. Feed expressed milk to infant until satisfied.    This is a transfer issue, not a supply issue.    Infant needs to be evaluated by ENT or pediatric dentist for tight posterior frenulum, and to evaluate maxillary frenulum.    Will f/u with lactation as needed and with pediatrician today.    Denies questions.  LACTATION PROBLEMS AND STRATEGIES:  Problems latching baby to breast: Baby should latch to areola (dark area) not just the nipple.  Baby's lips should be flanged outward.  Bring the baby up to the level of your breast by putting a pillow under the baby.  Dribble milk over the nipple or express milk so that baby can taste  it  Encourage a deeper latch with the asymetrical latch- lining baby's nose opposite mother's nipple.  Gently tickle your baby's lip with your nipple to encourage your baby to open his/her mouth wide.  Hold baby so that your breast is positioned deep in the baby's mouth.  Hold your baby close, tummy to tummy.  If baby does not latch to breast, express breast milk.  If engorged, express some milk to soften breast.  If the baby is not latched on well, break seal and gently try again.  Keep baby skin to skin and watch for feeding cues.  Massage breast to start milk-ejection reflex.  Place nipple and at least 1 inch of areola into baby's mouth.  Place your hand behind the baby's neck and shoulder.  Do not force baby's head into breast.  Put baby in the football hold or clutch hold, supporting his/her neck and head in sniffing position to open his/her throat.  Shape breast into oval shape (sandwich hold)  for deep latch.  Try different feeding positions (cradle, football, side etc.).  Use a breast feeding pillow to bring baby up to the level of the breast.  Use semi-reclined feeding position to allow baby to take an active role and trigger baby's inborn feeding behavior.  Use your hand to support your breast during feeding.  When your baby's mouth is wide open, quickly pull baby into your breast.  Your baby's chin should be pressed into your breast.  Pumping pointers: Air dry nipples, express milk and rub in or use an approved nipple cream after expressing., Apply heat to breasts before pumping., Choose a familiar comfortable place to express milk., If nipples are sore use less pressure and pump more frequently for shorter times., Listen to a tape of baby while pumping., Look at a photo of baby wile expressing., Massage breasts before and during pumping., Minimize distractions., Moisten flange before beginning to improve seal., Relax for 5 minutes before pumping., Stimulate the nipples and hand express a few drops before  "pumping., Switch breasts when flow lessens., and Use pumping bra/band for \"hands free\" pumping.  PATIENT INSTRUCTION HANDOUTS GIVEN:   Tips for pumping with an electric breast pump and milk storage for your healthy baby (PI# 123)  Suck training (PI# 176)  How to make formula safely  How to keep your breast pump kit clean  Foods that promote good milk production  Breastfeeding: Tips to increase your milk supply (PI# 162)  Breastfeeding: Sore and cracked nipples (PI# 167)  Breastfeeding: Plugged milk ducts/mastitis (PI# 164)  Breastfeeding: Engorgement (PI# 163)  Breast massage with milk expression by hand (PI# [id number])  LACTATION EDUCATION:  Correct Positioning, Correct Latch On, and Demo use of Pump       "

## 2024-08-14 NOTE — PATIENT INSTRUCTIONS
PATIENT DISCUSSION SUMMARY:  .  .  Mother and infant seen for concerns over latch and milk transfer.  Infant not satisfied after feedings and has supplemented with formula x 3 and expressed milk via bottle.    Mother has been feeding on demand.   Mother's milk in and milk easily expressible.    Infant weight loss now 9.3%.  Infant sleepy with moist mucous membranes.  Messy diaper in office.    Mother's nipples erect and breasts filling. Infant able to sustain latch.  Latch shallow, even with deepest latch achieved. Mother reports latch as comfortable and nipples slightly flattened after feeding. No audible swallows and infant only transferred 8 ml between both breasts.    Suck assessment abnormal: reveals infant with tight maxillary frenulum, and curls upper lip, but lip able to be everted.  Infant with good cupping and extension of tongue, with posterior humping of tongue, and moderate lateralization at this exam.  Infant not noted to elevate tongue past mid mouth on this exam. I Palate high and intact to palpation.    Discussed to bottle feed with more upright positioning and brandi lips to help infant have wide latch when using bottle by pulling down on chin and everting upper lip.    Mother instructed on suck training exercises.    Pumping session observed with [employer] S1     pump with   24   mm flanges.  Recommend slightly smaller flanges for improved fit.  Discussed increasing vacuum to comfort, use of massage and compression, and hand expression after pumping to improve breast emptying.  Mother shown technique for hand expression. Pumping out approximately 50  ml in 20   minutes. Milk volume is appropriate for day 5.    Reviewed post birth warning signs and safe sleep    Plan:  Mother to do suck training exercises prior to every feed.  Feed at least 8-12 times daily, both breasts for as long as infant is actively sucking and swallowing.  Limit to 10 minutes on one breast unless hearing active swallowing. Use  massage and compression to aid transfer.      At least 8 times daily, mother to pump both breasts at same time for 20 minutes, using massage and compression, with hand expression after to fully drain breast. Feed expressed milk to infant until satisfied.    This is a transfer issue, not a supply issue.    Infant needs to be evaluated by ENT or pediatric dentist for tight posterior frenulum, and to evaluate maxillary frenulum.    Will f/u with lactation as needed and with pediatrician today.    Denies questions.  LACTATION PROBLEMS AND STRATEGIES:  Problems latching baby to breast: Baby should latch to areola (dark area) not just the nipple.  Baby's lips should be flanged outward.  Bring the baby up to the level of your breast by putting a pillow under the baby.  Dribble milk over the nipple or express milk so that baby can taste it  Encourage a deeper latch with the asymetrical latch- lining baby's nose opposite mother's nipple.  Gently tickle your baby's lip with your nipple to encourage your baby to open his/her mouth wide.  Hold baby so that your breast is positioned deep in the baby's mouth.  Hold your baby close, tummy to tummy.  If baby does not latch to breast, express breast milk.  If engorged, express some milk to soften breast.  If the baby is not latched on well, break seal and gently try again.  Keep baby skin to skin and watch for feeding cues.  Massage breast to start milk-ejection reflex.  Place nipple and at least 1 inch of areola into baby's mouth.  Place your hand behind the baby's neck and shoulder.  Do not force baby's head into breast.  Put baby in the football hold or clutch hold, supporting his/her neck and head in sniffing position to open his/her throat.  Shape breast into oval shape (sandwich hold)  for deep latch.  Try different feeding positions (cradle, football, side etc.).  Use a breast feeding pillow to bring baby up to the level of the breast.  Use semi-reclined feeding position to  "allow baby to take an active role and trigger baby's inborn feeding behavior.  Use your hand to support your breast during feeding.  When your baby's mouth is wide open, quickly pull baby into your breast.  Your baby's chin should be pressed into your breast.  Pumping pointers: Air dry nipples, express milk and rub in or use an approved nipple cream after expressing., Apply heat to breasts before pumping., Choose a familiar comfortable place to express milk., If nipples are sore use less pressure and pump more frequently for shorter times., Listen to a tape of baby while pumping., Look at a photo of baby wile expressing., Massage breasts before and during pumping., Minimize distractions., Moisten flange before beginning to improve seal., Relax for 5 minutes before pumping., Stimulate the nipples and hand express a few drops before pumping., Switch breasts when flow lessens., and Use pumping bra/band for \"hands free\" pumping.  PATIENT INSTRUCTION HANDOUTS GIVEN:   Tips for pumping with an electric breast pump and milk storage for your healthy baby (PI# 123)  Suck training (PI# 176)  How to make formula safely  How to keep your breast pump kit clean  Foods that promote good milk production  Breastfeeding: Tips to increase your milk supply (PI# 162)  Breastfeeding: Sore and cracked nipples (PI# 167)  Breastfeeding: Plugged milk ducts/mastitis (PI# 164)  Breastfeeding: Engorgement (PI# 163)  Breast massage with milk expression by hand (PI# 728)  LACTATION EDUCATION:  Correct Positioning, Correct Latch On, and Demo use of Pump       "

## 2024-08-15 NOTE — LACTATION NOTE
Follow up phone call for lactation visit. Pumping and getting 1-1.5 oz each time she pumps.  Was seen by nurse practitioner and pediatric dentist who confirmed lip and tongue ties.  Will see pediatric dentist for frenectomy 8/20/24. Scheduled lactation visit for 8/22/24

## 2024-08-20 ENCOUNTER — TELEPHONE (OUTPATIENT)
Dept: LACTATION | Facility: CLINIC | Age: 39
End: 2024-08-20
Payer: COMMERCIAL

## 2024-08-20 NOTE — LACTATION NOTE
Infant evaluated by pediatric dentist, but parents choosing to wait on frenectomy at this time..  Mother feels like latch slightly improved, but infant still requiring pumped milk via bottle.  Has lactation appointment to evaluate latch and milk transfer 8/21/24.  Denies questions.

## 2024-08-21 ENCOUNTER — LACTATION CONSULT (OUTPATIENT)
Dept: LACTATION | Facility: CLINIC | Age: 39
End: 2024-08-21
Payer: COMMERCIAL

## 2024-08-21 DIAGNOSIS — O92.70 LACTATION DISORDER (HHS-HCC): Primary | ICD-10-CM

## 2024-08-21 PROCEDURE — 99211 OFF/OP EST MAY X REQ PHY/QHP: CPT | Performed by: OBSTETRICS & GYNECOLOGY

## 2024-08-21 ASSESSMENT — ENCOUNTER SYMPTOMS
DEPRESSION: 0
LOSS OF SENSATION IN FEET: 0
OCCASIONAL FEELINGS OF UNSTEADINESS: 0

## 2024-08-21 NOTE — PATIENT INSTRUCTIONS
.  Mother and infant seen for concerns over latch and milk transfer.  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 Infant not usually satisfied after feedings and has supplemented with pumped milk via bottles usually 1-2 oz after each feeding.     Mother has been feeding on demand.   Mother's milk in and milk easily expressible.      Infant weight gain 27 g per day since last lactation visit.  Infant sleepy with moist mucous membranes.  Wet and messy diaper in office.     Mother's nipples erect and breasts soft. Infant able to sustain latch.  Latch shallow, even with deepest latch achieved. Mother reports latch as comfortable  with minimal discomfort and nipples slightly flattened after feeding. Intermittent audible swallows.    Milk transfer improved taking 59 ml compared to  8 ml between both breasts at last visit. Infant did fatigue after both breasts then took 1.25 oz of expressed milk via bottle after breastfeeding.     Suck assessment abnormal: reveals infant with tight maxillary frenulum, and curls upper lip, but lip able to be everted.  Infant with good cupping and extension of tongue, with posterior humping of tongue, and moderate lateralization at this exam.  Infant not noted to elevate tongue past mid mouth on this exam.  Palate high and intact to palpation.     Mother instructed on suck training exercises.     Pumping session observed with Spectra S1   pump with  22   mm flanges.  Recommend slightly smaller flanges for improved fit.  Discussed increasing vacuum to comfort, use of massage and compression, and hand expression after pumping to improve breast emptying.  Mother shown technique for hand expression. Pumping out approximately 80 ml in 20   minutes.      Reviewed post birth warning signs and safe sleep     Plan:  Mother to do suck training exercises prior to every feed.  Feed at least 8-12 times daily, both breasts for as long as infant is  actively sucking and swallowing.  Limit to 10 minutes on one breast unless hearing active swallowing. Use massage and compression to aid transfer.       At least 8 times daily, mother to pump both breasts at same time for 20 minutes, using massage and compression, with hand expression after to fully drain breast. Feed expressed milk to infant until satisfied.     This is a transfer issue, not a supply issue.     Infant needs to be evaluated by ENT or pediatric dentist for tight posterior frenulum, and to evaluate maxillary frenulum.     Will f/u with lactation as needed and with pediatrician for routine care.     Denies questions.  LACTATION PROBLEMS AND STRATEGIES:  Problems latching baby to breast: Baby should latch to areola (dark area) not just the nipple.  Baby's lips should be flanged outward.  Bring the baby up to the level of your breast by putting a pillow under the baby.  Encourage a deeper latch with the asymetrical latch- lining baby's nose opposite mother's nipple.  Encourage nipple to stand up prior to feeing by pumping, nipple rolling or by brief application of ice.  Gently tickle your baby's lip with your nipple to encourage your baby to open his/her mouth wide.  Hold baby so that your breast is positioned deep in the baby's mouth.  Hold your baby close, tummy to tummy.  If baby does not latch to breast, express breast milk.  If the baby is not latched on well, break seal and gently try again.  Keep baby skin to skin and watch for feeding cues.  Massage breast to start milk-ejection reflex.  Place nipple and at least 1 inch of areola into baby's mouth.  Place your hand behind the baby's neck and shoulder.  Do not force baby's head into breast.  Put baby in the football hold or clutch hold, supporting his/her neck and head in sniffing position to open his/her throat.  Shape breast into oval shape (sandwich hold)  for deep latch.  Try different feeding positions (cradle, football, side etc.).  Use a  "breast feeding pillow to bring baby up to the level of the breast.  Use semi-reclined feeding position to allow baby to take an active role and trigger baby's inborn feeding behavior.  Use your hand to support your breast during feeding.  When your baby's mouth is wide open, quickly pull baby into your breast.  Your baby's chin should be pressed into your breast.  Pumping pointers: Air dry nipples, express milk and rub in or use an approved nipple cream after expressing., Allow 10  to 15 minutes per breast for single pumping, and 10 to 15 minutes total for double pumping., Apply heat to breasts before pumping., Choose a familiar comfortable place to express milk., If nipples are sore use less pressure and pump more frequently for shorter times., Listen to a tape of baby while pumping., Look at a photo of baby wile expressing., Massage breasts before and during pumping., Minimize distractions., Moisten flange before beginning to improve seal., Relax for 5 minutes before pumping., Stimulate the nipples and hand express a few drops before pumping., and Use pumping bra/band for \"hands free\" pumping.  PATIENT INSTRUCTION HANDOUTS GIVEN:   None  LACTATION EDUCATION:  Correct Positioning, Correct Latch On, and Demo use of Pump       "

## 2024-08-21 NOTE — PROGRESS NOTES
Lactation Counseling Note    Subjective:    Marisa Hopkins is a 38 y.o. patient referred for lactation counseling. She is here today due to Sore/cracked nipple(s), Breast pumping concerns/issues, Latch issues, and infant diagnoses with lingual and maxillary ties . She was referred by her  self .     OB HISTORY:   Healthcare Provider: [facility]   Information: Baby's Name: Violette Huff  : 24  MRN: [id number]  Healthcare Provider: Rose  Birth weight: 3968 gm    Objective:    BREASTFEEDING ASSESSMENT:   Breast Assessment: Medium, Symmetrical, Soft, and Compressible  Nipple Assessment/Stage: intact, erect, distorted shape or flattened, and sore Stage I - Pain or irritation with no skin breakdown  Areola: Normal  Feeding Position: baby - led, cradle, skin to skin, both sides, nipple to nose, and mother demonstrates good positioning  Latch: minimal assistance is needed, instructed on deep latch, shallow latch, latch is painful, upper lip turned in, flanged lips, chin moves in rhythmic motion, and nipple - slurping/pulls/nibbles  Suck/Feeding: sustained, coordinated suck/swallow/breathe, baby led rhythmically, audible swallowing, cheeks dimple during sucking, and supplemented breast  Alternative Feeding Methods: nursing at the breast and feeding expressed breast milk  Feeding and Cleaning instructions reviewed for: Paced bottle  Infant Feeding Method: Breast milk only  Infant Behavior: awake, fussy, readiness to feed, feeding cues observed, rooting response, and sucking  Infant Information: Ankyloglossia  Palate- high/arch/bubble/normal  Tongue protrudes over alveolar ridge  Tongue humped/bunched/retracted/elevated  Good cupping of tongue  Fontanel: flat  Mucous Membranes: moist  Breast Pain: Pain scale 0-10 (10 most pain): 0  Nipple Pain: Pain scale 0-10 (10 most pain): 1-2  Pain description: pinch  Before feeding pain 0-10 (10 most pain): 0  After adjustment pain 0-10 (10 most pain): 0  Other pain  relief methods: siverettes, Earth mama   Weight: Reported pediatrician visit weight: 8 lb 3 oz  Date of pediatrician weight: 24  Weight before feedin gm  Weight after feedin gm  Amount of breast milk transferred: 59 ml  Number of voids in the last 24 hours: 6  Number of stools in the last 24 hours: 6  Infant took 1.25 oz expressed milk via bottle after breastfeeding      PATIENT DISCUSSION SUMMARY:    .  Mother and infant seen for concerns over latch and milk transfer.  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 Infant not usually satisfied after feedings and has supplemented with pumped milk via bottles usually 1-2 oz after each feeding.     Mother has been feeding on demand.   Mother's milk in and milk easily expressible.      Infant weight gain 27 g per day since last lactation visit.  Infant sleepy with moist mucous membranes.  Wet and messy diaper in office.     Mother's nipples erect and breasts soft. Infant able to sustain latch.  Latch shallow, even with deepest latch achieved. Mother reports latch as comfortable  with minimal discomfort and nipples slightly flattened after feeding. Intermittent audible swallows.    Milk transfer improved taking 59 ml compared to  8 ml between both breasts at last visit. Infant did fatigue after both breasts then took 1.25 oz of expressed milk via bottle after breastfeeding.     Suck assessment abnormal: reveals infant with tight maxillary frenulum, and curls upper lip, but lip able to be everted.  Infant with good cupping and extension of tongue, with posterior humping of tongue, and moderate lateralization at this exam.  Infant not noted to elevate tongue past mid mouth on this exam.  Palate high and intact to palpation.     Mother instructed on suck training exercises.     Pumping session observed with Spectra S1   pump with  22   mm flanges.  Recommend slightly smaller flanges for improved fit.   Discussed increasing vacuum to comfort, use of massage and compression, and hand expression after pumping to improve breast emptying.  Mother shown technique for hand expression. Pumping out approximately 80 ml in 20   minutes.      Reviewed post birth warning signs and safe sleep     Plan:  Mother to do suck training exercises prior to every feed.  Feed at least 8-12 times daily, both breasts for as long as infant is actively sucking and swallowing.  Limit to 10 minutes on one breast unless hearing active swallowing. Use massage and compression to aid transfer.       At least 8 times daily, mother to pump both breasts at same time for 20 minutes, using massage and compression, with hand expression after to fully drain breast. Feed expressed milk to infant until satisfied.     This is a transfer issue, not a supply issue.     Infant needs to be evaluated by ENT or pediatric dentist for tight posterior frenulum, and to evaluate maxillary frenulum.     Will f/u with lactation as needed and with pediatrician for routine.     Denies questions.  LACTATION PROBLEMS AND STRATEGIES:  Problems latching baby to breast: Baby should latch to areola (dark area) not just the nipple.  Baby's lips should be flanged outward.  Bring the baby up to the level of your breast by putting a pillow under the baby.  Encourage a deeper latch with the asymetrical latch- lining baby's nose opposite mother's nipple.  Encourage nipple to stand up prior to feeing by pumping, nipple rolling or by brief application of ice.  Gently tickle your baby's lip with your nipple to encourage your baby to open his/her mouth wide.  Hold baby so that your breast is positioned deep in the baby's mouth.  Hold your baby close, tummy to tummy.  If baby does not latch to breast, express breast milk.  If the baby is not latched on well, break seal and gently try again.  Keep baby skin to skin and watch for feeding cues.  Massage breast to start milk-ejection  "reflex.  Place nipple and at least 1 inch of areola into baby's mouth.  Place your hand behind the baby's neck and shoulder.  Do not force baby's head into breast.  Put baby in the football hold or clutch hold, supporting his/her neck and head in sniffing position to open his/her throat.  Shape breast into oval shape (sandwich hold)  for deep latch.  Try different feeding positions (cradle, football, side etc.).  Use a breast feeding pillow to bring baby up to the level of the breast.  Use semi-reclined feeding position to allow baby to take an active role and trigger baby's inborn feeding behavior.  Use your hand to support your breast during feeding.  When your baby's mouth is wide open, quickly pull baby into your breast.  Your baby's chin should be pressed into your breast.  Pumping pointers: Air dry nipples, express milk and rub in or use an approved nipple cream after expressing., Allow 10  to 15 minutes per breast for single pumping, and 10 to 15 minutes total for double pumping., Apply heat to breasts before pumping., Choose a familiar comfortable place to express milk., If nipples are sore use less pressure and pump more frequently for shorter times., Listen to a tape of baby while pumping., Look at a photo of baby wile expressing., Massage breasts before and during pumping., Minimize distractions., Moisten flange before beginning to improve seal., Relax for 5 minutes before pumping., Stimulate the nipples and hand express a few drops before pumping., and Use pumping bra/band for \"hands free\" pumping.  PATIENT INSTRUCTION HANDOUTS GIVEN:   None  LACTATION EDUCATION:  Correct Positioning, Correct Latch On, and Demo use of Pump       "

## 2024-08-27 ENCOUNTER — LACTATION CONSULT (OUTPATIENT)
Dept: LACTATION | Facility: CLINIC | Age: 39
End: 2024-08-27
Payer: COMMERCIAL

## 2024-08-27 DIAGNOSIS — O92.70 LACTATION DISORDER (HHS-HCC): Primary | ICD-10-CM

## 2024-08-27 PROCEDURE — 99211 OFF/OP EST MAY X REQ PHY/QHP: CPT | Performed by: OBSTETRICS & GYNECOLOGY

## 2024-08-27 ASSESSMENT — ENCOUNTER SYMPTOMS
LOSS OF SENSATION IN FEET: 0
OCCASIONAL FEELINGS OF UNSTEADINESS: 0

## 2024-08-27 NOTE — PROGRESS NOTES
Lactation Counseling Note    Subjective:    Marisa Hopkins is a 38 y.o. patient referred for lactation counseling. She is here today due to Breast pumping concerns/issues, Latch issues, and s/p lingual and maxillary frenectomy 24 . She was referred by her  self .     OB HISTORY:   Healthcare Provider: [facility]   Information: Baby's Name: Violette Huff  : 24  MRN: [id number]  Healthcare Provider: Rose  Birth weight: 3789 gm    Objective:    BREASTFEEDING ASSESSMENT:   Breast Assessment: Medium, Symmetrical, Soft, and Compressible  Nipple Assessment/Stage: erect and distorted shape or flattened sore  Areola: Normal  Feeding Position: breast sandwich, cross - cradle, skin to skin, both sides, nipple to nose, and mother demonstrates good positioning  Latch: minimal assistance is needed, instructed on deep latch, eagerly grasped on to latch, shallow latch, mouth not open wide enough, comfortable with no pain, sucking and swallowing, sucks with long jaw movement, bursts of sucking, swallowing, and rest, upper lip turned in, flanged lips, chin moves in rhythmic motion, and nipple - slurping/pulls/nibbles  Suck/Feeding: sustained, coordinated suck/swallow/breathe, tactile stimulation needed, and audible swallowing  Alternative Feeding Methods: nursing at the breast and feeding expressed breast milk  Feeding and Cleaning instructions reviewed for: Paced bottle  Infant Feeding Method: Breast milk only  Infant Behavior: awake, quiet alert, readiness to feed, feeding cues observed, rooting response, and suckles on and off, needs stimulation  Infant Information: Palate- high/arch/bubble/normal  Tongue protrudes over alveolar ridge  Good cupping of tongue  Fontanel: flat  Mucous Membranes: moist  No other concerns  Breast Pain: Pain scale 0-10 (10 most pain): 0  Nipple Pain: Pain scale 0-10 (10 most pain): 0  Pain description: sore  Before feeding pain 0-10 (10 most pain): 2  After adjustment pain  0-10 (10 most pain): 2  Other pain relief methods: paco, mother's promise balm   No other concerns  Weight: Reported pediatrician visit weight: 8 lb 3 oz  Date of pediatrician weight: 24  Weight before feedin gm  Weight after feedin gm  Amount of breast milk transferred: 69 ml  Number of voids in the last 24 hours: 6  Number of stools in the last 24 hours: 6  Infant took 1.5 oz expressed milk via bottle after feeding at breast      PATIENT DISCUSSION SUMMARY:  .  .  Mother and infant seen s/p lingual and maxillary frenectomy   24. Mother has still been feeding at breast and offering 1-2.5 oz of expressed milk via bottle at each feeding.      Infant weight gain good gaining 38.5 g per day since last lactation visit.    Infant able to sustain latch on both breasts in cross cradle and laid back  posotionMother reports latch as comfortable and nipple rounded  with slight flattening after feeds.    Suck assessment reveals infant with improved extension, elevation and lateralization. Lateralizing more to left at this visit. Cupping remains good.  Less humping of tongue noted, and infant with wider latch and improved flange of both upper and lower lips. Infant still pulling back and sliding.     Milk transfer 69 ml compared to 59 ml at last visit.    Infant still fatigues during feeding and alternates between nutritive sucking and non-nutritive fluttery sucking.  Discussed with mother that suck will improve over time as tongue strengthens and infant learns to use previously unused muscles.  Encouraged as much feeding at the breast as possible.      Reviewed post birth warning signs and safe sleep    Plan:  Mother to continue suck training exercises prior to feeds, and stretching exercises as recommended by frenotomy provider.  Mother to feed infant on demand at least 8-12 times daily, preferably skin to skin in laid back position to facilitate forward tongue positioning and deep latch.   Mother to use breast massage and compression during feeding, to facilitate milk transfer.  Limit time at breast if infant fatiguing, but start to allow infant to work longer on each side. Mother to offer expressed milk  if  infant still hungry.  To continue to pump both breasts after feeds for 10-15 minutes, until infant more efficient with transfer.    To follow up with pediatrician  For 2 month visit, and with lactation as desires.    Denies questions  LACTATION PROBLEMS AND STRATEGIES:  Problems latching baby to breast: Baby should latch to areola (dark area) not just the nipple.  Baby's lips should be flanged outward.  Bring the baby up to the level of your breast by putting a pillow under the baby.  Dribble milk over the nipple or express milk so that baby can taste it  Encourage a deeper latch with the asymetrical latch- lining baby's nose opposite mother's nipple.  Gently tickle your baby's lip with your nipple to encourage your baby to open his/her mouth wide.  Hold baby so that your breast is positioned deep in the baby's mouth.  Hold your baby close, tummy to tummy.  If the baby is not latched on well, break seal and gently try again.  Keep baby skin to skin and watch for feeding cues.  Massage breast to start milk-ejection reflex.  Place nipple and at least 1 inch of areola into baby's mouth.  Place your hand behind the baby's neck and shoulder.  Do not force baby's head into breast.  Put baby in the football hold or clutch hold, supporting his/her neck and head in sniffing position to open his/her throat.  Shape breast into oval shape (sandwich hold)  for deep latch.  Try different feeding positions (cradle, football, side etc.).  Use a breast feeding pillow to bring baby up to the level of the breast.  Use semi-reclined feeding position to allow baby to take an active role and trigger baby's inborn feeding behavior.  Use your hand to support your breast during feeding.  When your baby's mouth is wide  "open, quickly pull baby into your breast.  Your baby's chin should be pressed into your breast.  Pumping pointers: Air dry nipples, express milk and rub in or use an approved nipple cream after expressing., Allow 10  to 15 minutes per breast for single pumping, and 10 to 15 minutes total for double pumping., Apply heat to breasts before pumping., Choose a familiar comfortable place to express milk., If nipples are sore use less pressure and pump more frequently for shorter times., Listen to a tape of baby while pumping., Look at a photo of baby wile expressing., Massage breasts before and during pumping., Minimize distractions., Moisten flange before beginning to improve seal., Pick a good time of day to begin (early in a.m., during the baby's long sleep stretch, when skipping a feeding, etc.)., Relax for 5 minutes before pumping., Stimulate the nipples and hand express a few drops before pumping., and Use pumping bra/band for \"hands free\" pumping.  PATIENT INSTRUCTION HANDOUTS GIVEN:   None given  LACTATION EDUCATION:  Correct Positioning and Correct Latch On  "

## 2024-08-27 NOTE — PATIENT INSTRUCTIONS
PATIENT DISCUSSION SUMMARY:  .  .  Mother and infant seen s/p lingual and maxillary frenectomy   8/26/24. Mother has still been feeding at breast and offering 1-2.5 oz of expressed milk via bottle at each feeding.      Infant weight gain good gaining 38.5 g per day since last lactation visit.    Infant able to sustain latch on both breasts in cross cradle and laid back  posotionMother reports latch as comfortable and nipple rounded  with slight flattening after feeds.    Suck assessment reveals infant with improved extension, elevation and lateralization. Lateralizing more to left at this visit. Cupping remains good.  Less humping of tongue noted, and infant with wider latch and improved flange of both upper and lower lips. Infant still pulling back and sliding.     Milk transfer 69 ml compared to 59 ml at last visit.    Infant still fatigues during feeding and alternates between nutritive sucking and non-nutritive fluttery sucking.  Discussed with mother that suck will improve over time as tongue strengthens and infant learns to use previously unused muscles.  Encouraged as much feeding at the breast as possible.      Reviewed post birth warning signs and safe sleep    Plan:  Mother to continue suck training exercises prior to feeds, and stretching exercises as recommended by frenotomy provider.  Mother to feed infant on demand at least 8-12 times daily, preferably skin to skin in laid back position to facilitate forward tongue positioning and deep latch.  Mother to use breast massage and compression during feeding, to facilitate milk transfer.  Limit time at breast if infant fatiguing, but start to allow infant to work longer on each side. Mother to offer expressed milk  if  infant still hungry.  To continue to pump both breasts after feeds for 10-15 minutes, until infant more efficient with transfer.    To follow up with pediatrician  For 2 month visit, and with lactation as desires.    Denies  questions  LACTATION PROBLEMS AND STRATEGIES:  Problems latching baby to breast: Baby should latch to areola (dark area) not just the nipple.  Baby's lips should be flanged outward.  Bring the baby up to the level of your breast by putting a pillow under the baby.  Dribble milk over the nipple or express milk so that baby can taste it  Encourage a deeper latch with the asymetrical latch- lining baby's nose opposite mother's nipple.  Gently tickle your baby's lip with your nipple to encourage your baby to open his/her mouth wide.  Hold baby so that your breast is positioned deep in the baby's mouth.  Hold your baby close, tummy to tummy.  If the baby is not latched on well, break seal and gently try again.  Keep baby skin to skin and watch for feeding cues.  Massage breast to start milk-ejection reflex.  Place nipple and at least 1 inch of areola into baby's mouth.  Place your hand behind the baby's neck and shoulder.  Do not force baby's head into breast.  Put baby in the football hold or clutch hold, supporting his/her neck and head in sniffing position to open his/her throat.  Shape breast into oval shape (sandwich hold)  for deep latch.  Try different feeding positions (cradle, football, side etc.).  Use a breast feeding pillow to bring baby up to the level of the breast.  Use semi-reclined feeding position to allow baby to take an active role and trigger baby's inborn feeding behavior.  Use your hand to support your breast during feeding.  When your baby's mouth is wide open, quickly pull baby into your breast.  Your baby's chin should be pressed into your breast.  Pumping pointers: Air dry nipples, express milk and rub in or use an approved nipple cream after expressing., Allow 10  to 15 minutes per breast for single pumping, and 10 to 15 minutes total for double pumping., Apply heat to breasts before pumping., Choose a familiar comfortable place to express milk., If nipples are sore use less pressure and pump  "more frequently for shorter times., Listen to a tape of baby while pumping., Look at a photo of baby wile expressing., Massage breasts before and during pumping., Minimize distractions., Moisten flange before beginning to improve seal., Pick a good time of day to begin (early in a.m., during the baby's long sleep stretch, when skipping a feeding, etc.)., Relax for 5 minutes before pumping., Stimulate the nipples and hand express a few drops before pumping., and Use pumping bra/band for \"hands free\" pumping.  PATIENT INSTRUCTION HANDOUTS GIVEN:   None given  LACTATION EDUCATION:  Correct Positioning and Correct Latch On  "

## 2024-08-28 ENCOUNTER — TELEPHONE (OUTPATIENT)
Dept: LACTATION | Facility: CLINIC | Age: 39
End: 2024-08-28
Payer: COMMERCIAL

## 2024-08-28 NOTE — PROGRESS NOTES
Follow up phone call for lactation visit. Left message for patient to call IBCLC at [phone] to discuss progress or concerns.

## 2024-09-04 ENCOUNTER — LACTATION CONSULT (OUTPATIENT)
Dept: LACTATION | Facility: CLINIC | Age: 39
End: 2024-09-04
Payer: COMMERCIAL

## 2024-09-04 DIAGNOSIS — O92.70 LACTATION DISORDER (HHS-HCC): Primary | ICD-10-CM

## 2024-09-04 PROCEDURE — 99211 OFF/OP EST MAY X REQ PHY/QHP: CPT | Performed by: OBSTETRICS & GYNECOLOGY

## 2024-09-04 ASSESSMENT — ENCOUNTER SYMPTOMS
OCCASIONAL FEELINGS OF UNSTEADINESS: 0
LOSS OF SENSATION IN FEET: 0
DEPRESSION: 0

## 2024-09-04 NOTE — PATIENT INSTRUCTIONS
PATIENT DISCUSSION SUMMARY:  .  .  Mother and infant seen s/p lingual and maxillary frenectomy   24. Mother has still been feeding at breast and offering 0.5-3 oz of expressed milk via bottle at each feeding.       Infant weight gain good gaining 32.8 g per day since last lactation visit.     Infant able to sustain latch on both breasts in cross cradle position. Mother reports latch as comfortable and nipples rounded  with slight flattening after feeds. Left nipple rounded with more flattening on right nipple.     Suck assessment reveals infant with improved extension, elevation and lateralization. Still lateralizing more to left at this visit. Lateralization is still limited, as is elevation. Cupping remains good.  Less humping of tongue noted, and infant with wider latch and improved flange of both upper and lower lips. Infant still pulling back and sliding when starting to fatigue.      Milk transfer  is slowly increasin ml compared to 69 ml at last visit.     Infant still fatigues during feeding and alternates between nutritive sucking and non-nutritive fluttery sucking.  Infant requires a lot of tactile stimulation to continue feeding. Discussed with mother that suck will improve over time as tongue strengthens and infant learns to use previously unused muscles.  Encouraged as much feeding at the breast as possible.       Reviewed post birth warning signs and safe sleep     Plan:  Mother to continue suck training exercises prior to feeds, and stretching exercises as recommended by frenotomy provider.  Mother to feed infant on demand at least 8-12 times daily, preferably skin to skin.  Mother to use breast massage and compression during feeding, to facilitate milk transfer.  Limit time at breast if infant fatiguing, but start to allow infant to work longer on each side.     Mother to offer expressed milk  if  infant still hungry.  To continue to pump both breasts after feeds for 10 minutes, until  infant more efficient with transfer.    If tongue mobility remains limited, recommend OT consult.     To follow up with pediatrician  For 2 month visit, and with lactation as desires.     Denies questions    LACTATION PROBLEMS AND STRATEGIES:  Problems latching baby to breast: Baby should latch to areola (dark area) not just the nipple.  Baby's lips should be flanged outward.  Bring the baby up to the level of your breast by putting a pillow under the baby.  Dribble milk over the nipple or express milk so that baby can taste it  Encourage a deeper latch with the asymetrical latch- lining baby's nose opposite mother's nipple.  Gently tickle your baby's lip with your nipple to encourage your baby to open his/her mouth wide.  Hold baby so that your breast is positioned deep in the baby's mouth.  Hold your baby close, tummy to tummy.  If the baby is not latched on well, break seal and gently try again.  Keep baby skin to skin and watch for feeding cues.  Massage breast to start milk-ejection reflex.  Place nipple and at least 1 inch of areola into baby's mouth.  Place your hand behind the baby's neck and shoulder.  Do not force baby's head into breast.  Put baby in the football hold or clutch hold, supporting his/her neck and head in sniffing position to open his/her throat.  Shape breast into oval shape (sandwich hold)  for deep latch.  Try different feeding positions (cradle, football, side etc.).  Use a breast feeding pillow to bring baby up to the level of the breast.  Use semi-reclined feeding position to allow baby to take an active role and trigger baby's inborn feeding behavior.  Use your hand to support your breast during feeding.  When your baby's mouth is wide open, quickly pull baby into your breast.  Your baby's chin should be pressed into your breast.  Pumping pointers: Air dry nipples, express milk and rub in or use an approved nipple cream after expressing., Allow 10  to 15 minutes per breast for single  "pumping, and 10 to 15 minutes total for double pumping., Apply heat to breasts before pumping., Choose a familiar comfortable place to express milk., If nipples are sore use less pressure and pump more frequently for shorter times., Listen to a tape of baby while pumping., Look at a photo of baby wile expressing., Massage breasts before and during pumping., Minimize distractions., Moisten flange before beginning to improve seal., Relax for 5 minutes before pumping., Stimulate the nipples and hand express a few drops before pumping., and Use pumping bra/band for \"hands free\" pumping.  PATIENT INSTRUCTION HANDOUTS GIVEN:   None given  LACTATION EDUCATION:  Correct Positioning and Correct Latch On       "

## 2024-09-04 NOTE — PROGRESS NOTES
Lactation Counseling Note    Subjective:    Marisa Hopkins is a 38 y.o. patient referred for lactation counseling. She is here today due to Sore/cracked nipple(s), Breast pumping concerns/issues, Latch issues, and lingual and maxillary frenectomy 24  . She was referred by her  self .     OB HISTORY:   Healthcare Provider: [facility]   Information: Baby's Name: Violette Huff  : 24  MRN: [id number]  Healthcare Provider: Rose  Birth weight: 3789 gm    Objective:    BREASTFEEDING ASSESSMENT:   Breast Assessment: Medium, Symmetrical, Soft, and Compressible  Nipple Assessment/Stage: intact, erect, distorted shape or flattened, and sore Stage I - Pain or irritation with no skin breakdown  Areola: Normal  Feeding Position: baby - led, cross - cradle, skin to skin, both sides, mother needs assistance with latch/positioning, and baby's head too high over breast  Latch: minimal assistance is needed, instructed on deep latch, eagerly grasped on to latch, areolar attachment, latch achieved, mouth not open wide enough, comfortable with no pain, latch is painful, sucking and swallowing, sucks with long jaw movement, chin and lower lip contact breast first, bursts of sucking, swallowing, and rest, upper lip turned in, lower lip turned in, flanged lips, chin moves in rhythmic motion, correct tongue position, and nipple - slurping/pulls/nibbles  Suck/Feeding: sustained, coordinated suck/swallow/breathe, tactile stimulation needed, and audible swallowing  Alternative Feeding Methods: nursing at the breast and feeding expressed breast milk  Feeding and Cleaning instructions reviewed for: Paced bottle  Infant Feeding Method: Breast milk only  Infant Behavior: awake, sleepy, readiness to feed, feeding cues observed, rooting response, and suckles on and off, needs stimulation  Infant Information: Ankyloglossia  Post status frenotomy  Palate- high/arch/bubble/normal  Tongue protrudes over alveolar ridge  Good  cupping of tongue  Fontanel: flat  Mucous Membranes: moist  No other concerns  Breast Pain: Pain scale 0-10 (10 most pain): 0  Nipple Pain: Pain scale 0-10 (10 most pain): 0-5  Pain description: pinch  Before feeding pain 0-10 (10 most pain): 3-4  After adjustment pain 0-10 (10 most pain): 0  Other pain relief methods: silverettes, Mother's Promise balm  No other concerns  Weight: Reported pediatrician visit weight: 8 lb 3 oz  Date of pediatrician weight: 24  Weight before feedin gm  Weight after feedin gm  Amount of breast milk transferred: 80 ml  Number of voids in the last 24 hours: 6-8  Number of stools in the last 24 hours: 6-8  Infant took 15 ml expressed milk via bottle after feeding        PATIENT DISCUSSION SUMMARY:  .  .  Mother and infant seen s/p lingual and maxillary frenectomy   24. Mother has still been feeding at breast and offering 0.5-3 oz of expressed milk via bottle at each feeding.       Infant weight gain good gaining 32.8 g per day since last lactation visit.     Infant able to sustain latch on both breasts in cross cradle position. Mother reports latch as comfortable and nipples rounded  with slight flattening after feeds. Left nipple rounded with more flattening on right nipple.     Suck assessment reveals infant with improved extension, elevation and lateralization. Still lateralizing more to left at this visit. Lateralization is still limited, as is elevation. Cupping remains good.  Less humping of tongue noted, and infant with wider latch and improved flange of both upper and lower lips. Infant still pulling back and sliding when starting to fatigue.      Milk transfer  is slowly increasin ml compared to 69 ml at last visit.     Infant still fatigues during feeding and alternates between nutritive sucking and non-nutritive fluttery sucking.  Infant requires a lot of tactile stimulation to continue feeding. Discussed with mother that suck will improve over time  as tongue strengthens and infant learns to use previously unused muscles.  Encouraged as much feeding at the breast as possible.       Reviewed post birth warning signs and safe sleep     Plan:  Mother to continue suck training exercises prior to feeds, and stretching exercises as recommended by frenotomy provider.  Mother to feed infant on demand at least 8-12 times daily, preferably skin to skin.  Mother to use breast massage and compression during feeding, to facilitate milk transfer.  Limit time at breast if infant fatiguing, but start to allow infant to work longer on each side.     Mother to offer expressed milk  if  infant still hungry.  To continue to pump both breasts after feeds for 10 minutes, until infant more efficient with transfer.    If tongue mobility remains limited, recommend OT consult.     To follow up with pediatrician  For 2 month visit, and with lactation as desires.     Denies questions    LACTATION PROBLEMS AND STRATEGIES:  Problems latching baby to breast: Baby should latch to areola (dark area) not just the nipple.  Baby's lips should be flanged outward.  Bring the baby up to the level of your breast by putting a pillow under the baby.  Dribble milk over the nipple or express milk so that baby can taste it  Encourage a deeper latch with the asymetrical latch- lining baby's nose opposite mother's nipple.  Gently tickle your baby's lip with your nipple to encourage your baby to open his/her mouth wide.  Hold baby so that your breast is positioned deep in the baby's mouth.  Hold your baby close, tummy to tummy.  If the baby is not latched on well, break seal and gently try again.  Keep baby skin to skin and watch for feeding cues.  Massage breast to start milk-ejection reflex.  Place nipple and at least 1 inch of areola into baby's mouth.  Place your hand behind the baby's neck and shoulder.  Do not force baby's head into breast.  Put baby in the football hold or clutch hold, supporting  "his/her neck and head in sniffing position to open his/her throat.  Shape breast into oval shape (sandwich hold)  for deep latch.  Try different feeding positions (cradle, football, side etc.).  Use a breast feeding pillow to bring baby up to the level of the breast.  Use semi-reclined feeding position to allow baby to take an active role and trigger baby's inborn feeding behavior.  Use your hand to support your breast during feeding.  When your baby's mouth is wide open, quickly pull baby into your breast.  Your baby's chin should be pressed into your breast.  Pumping pointers: Air dry nipples, express milk and rub in or use an approved nipple cream after expressing., Allow 10  to 15 minutes per breast for single pumping, and 10 to 15 minutes total for double pumping., Apply heat to breasts before pumping., Choose a familiar comfortable place to express milk., If nipples are sore use less pressure and pump more frequently for shorter times., Listen to a tape of baby while pumping., Look at a photo of baby wile expressing., Massage breasts before and during pumping., Minimize distractions., Moisten flange before beginning to improve seal., Relax for 5 minutes before pumping., Stimulate the nipples and hand express a few drops before pumping., and Use pumping bra/band for \"hands free\" pumping.  PATIENT INSTRUCTION HANDOUTS GIVEN:   None given  LACTATION EDUCATION:  Correct Positioning and Correct Latch On       "

## 2024-09-06 ENCOUNTER — TELEPHONE (OUTPATIENT)
Dept: LACTATION | Facility: CLINIC | Age: 39
End: 2024-09-06
Payer: COMMERCIAL

## 2024-09-06 NOTE — LACTATION NOTE
Follow up phone call for lactation visit. Saw pediatric dentist and tongue mobility is still limited, so is starting massage.  Discussed gradually reducing pumping duration before reducing frequency as infant starts to feed better. Scheduled follow up lactation appointment for 9/13/24.    Denies questions.

## 2024-09-10 NOTE — PROGRESS NOTES
Subjective   Marisa Hopkins is a 38 y.o.  who is here for a postpartum visit.  Baby was delivered by  on 2024.  Complications included shoulder dystocia.  She is doing well.   She denies any symptoms of postpartum blues or depression.  She is breastfeeding.  Vaginal bleeding is light.  She has not had intercourse yet.        Objective   /80   Wt 55.8 kg (123 lb)   LMP 2023 (Exact Date)   Breastfeeding Yes   BMI 21.10 kg/m²        General:   Alert and oriented, in no acute distress   Neck:    Breast/Axilla:    Abdomen: Soft, non-tender, without masses or organomegaly   Vulva: Normal architecture without erythema, masses, or lesions.    Vagina: Normal mucosa without lesions, masses, or atrophy. No abnormal vaginal discharge.    Cervix: Normal without masses, lesions, or signs of cervicitis.    Uterus: Normal mobile, non-enlarged uterus    Adnexa: Normal without masses or lesions   Pelvic Floor No POP noted. No high tone pelvic floor    Psych Normal affect. Normal mood.        Assessment/Plan   Doing well postpartum.   Continue prenatal vitamins while breastfeeding.  Okay to resume normal activities.  Planning natural family planning for birth control.  Follow up for annual exam.  Vaginal atrophy associated with breastfeeding - vaginal estrogen sent.

## 2024-09-12 ENCOUNTER — APPOINTMENT (OUTPATIENT)
Dept: OBSTETRICS AND GYNECOLOGY | Facility: CLINIC | Age: 39
End: 2024-09-12
Payer: COMMERCIAL

## 2024-09-12 ENCOUNTER — LAB (OUTPATIENT)
Dept: LAB | Facility: LAB | Age: 39
End: 2024-09-12
Payer: COMMERCIAL

## 2024-09-12 VITALS — WEIGHT: 123 LBS | DIASTOLIC BLOOD PRESSURE: 80 MMHG | SYSTOLIC BLOOD PRESSURE: 129 MMHG | BODY MASS INDEX: 21.1 KG/M2

## 2024-09-12 DIAGNOSIS — N95.2 VAGINAL ATROPHY: ICD-10-CM

## 2024-09-12 LAB
T4 FREE SERPL-MCNC: 1.66 NG/DL (ref 0.61–1.12)
TSH SERPL-ACNC: 0.03 MIU/L (ref 0.44–3.98)

## 2024-09-12 PROCEDURE — 84443 ASSAY THYROID STIM HORMONE: CPT

## 2024-09-12 PROCEDURE — 84439 ASSAY OF FREE THYROXINE: CPT

## 2024-09-12 PROCEDURE — 36415 COLL VENOUS BLD VENIPUNCTURE: CPT

## 2024-09-12 RX ORDER — ESTRADIOL 0.1 MG/G
2 CREAM VAGINAL DAILY
Qty: 42.5 G | Refills: 12 | Status: SHIPPED | OUTPATIENT
Start: 2024-09-12 | End: 2025-09-12

## 2024-09-12 ASSESSMENT — EDINBURGH POSTNATAL DEPRESSION SCALE (EPDS)
THE THOUGHT OF HARMING MYSELF HAS OCCURRED TO ME: NEVER
I HAVE BEEN SO UNHAPPY THAT I HAVE HAD DIFFICULTY SLEEPING: NOT AT ALL
I HAVE BEEN SO UNHAPPY THAT I HAVE BEEN CRYING: NO, NEVER
I HAVE BLAMED MYSELF UNNECESSARILY WHEN THINGS WENT WRONG: NO, NEVER
I HAVE FELT SCARED OR PANICKY FOR NO GOOD REASON: NO, NOT AT ALL
I HAVE LOOKED FORWARD WITH ENJOYMENT TO THINGS: AS MUCH AS I EVER DID
THINGS HAVE BEEN GETTING ON TOP OF ME: NO, I HAVE BEEN COPING AS WELL AS EVER
I HAVE FELT SAD OR MISERABLE: NO, NOT AT ALL
I HAVE BEEN ANXIOUS OR WORRIED FOR NO GOOD REASON: NO, NOT AT ALL
I HAVE BEEN ABLE TO LAUGH AND SEE THE FUNNY SIDE OF THINGS: NOT AT ALL
TOTAL SCORE: 3

## 2024-09-12 ASSESSMENT — ENCOUNTER SYMPTOMS
GASTROINTESTINAL NEGATIVE: 0
CONSTITUTIONAL NEGATIVE: 0
EYES NEGATIVE: 0
RESPIRATORY NEGATIVE: 0
ALLERGIC/IMMUNOLOGIC NEGATIVE: 0
NEUROLOGICAL NEGATIVE: 0
MUSCULOSKELETAL NEGATIVE: 0
PSYCHIATRIC NEGATIVE: 0
CARDIOVASCULAR NEGATIVE: 0
HEMATOLOGIC/LYMPHATIC NEGATIVE: 0
ENDOCRINE NEGATIVE: 0

## 2024-09-12 ASSESSMENT — PAIN SCALES - GENERAL: PAINLEVEL: 0-NO PAIN

## 2024-09-13 ENCOUNTER — APPOINTMENT (OUTPATIENT)
Dept: LACTATION | Facility: CLINIC | Age: 39
End: 2024-09-13
Payer: COMMERCIAL

## 2024-09-13 ENCOUNTER — TELEPHONE (OUTPATIENT)
Dept: OBSTETRICS AND GYNECOLOGY | Facility: CLINIC | Age: 39
End: 2024-09-13
Payer: COMMERCIAL

## 2024-09-13 NOTE — TELEPHONE ENCOUNTER
Called patient to discuss results  Left vm for patient to return call.    BALJEET Gomez RN      ----- Message from Renzo Jeffries sent at 9/13/2024  2:23 PM EDT -----  Please let Marisa know that we can decrease her synthroid to 125mcg daily and recheck  in 4 weeks.  She should also follow up with her PCP.  ----- Message -----  From: Lab, Background User  Sent: 9/12/2024   2:04 PM EDT  To: Renzo Jeffries MD

## 2024-09-16 ENCOUNTER — TELEPHONE (OUTPATIENT)
Dept: OBSTETRICS AND GYNECOLOGY | Facility: CLINIC | Age: 39
End: 2024-09-16
Payer: COMMERCIAL

## 2024-09-16 NOTE — TELEPHONE ENCOUNTER
Called patient to discuss results  Left vm for patient to return call.    BALJEET Gomez RN      MD Nancy Kimble RN  Please let Marisa know that we can decrease her synthroid to 125mcg daily and recheck  in 4 weeks.  She should also follow up with her PCP.

## 2024-09-17 DIAGNOSIS — O99.280 HYPOTHYROIDISM IN PREGNANCY, ANTEPARTUM (HHS-HCC): ICD-10-CM

## 2024-09-17 DIAGNOSIS — E03.9 HYPOTHYROIDISM IN PREGNANCY, ANTEPARTUM (HHS-HCC): ICD-10-CM

## 2024-09-17 RX ORDER — LEVOTHYROXINE SODIUM 125 UG/1
125 TABLET ORAL
Qty: 90 TABLET | Refills: 1 | Status: SHIPPED | OUTPATIENT
Start: 2024-09-17

## 2024-09-17 NOTE — TELEPHONE ENCOUNTER
Marisa Hopkins  Advised of thyroid med dose change and advised to call PCP for the 4 week follow up. She is awaiting medication to be sent to pharmacy on file.    Linda Patricio MA

## 2024-09-18 ENCOUNTER — LACTATION CONSULT (OUTPATIENT)
Dept: LACTATION | Facility: CLINIC | Age: 39
End: 2024-09-18
Payer: COMMERCIAL

## 2024-09-18 DIAGNOSIS — O92.70 LACTATION DISORDER (HHS-HCC): Primary | ICD-10-CM

## 2024-09-18 PROCEDURE — 99211 OFF/OP EST MAY X REQ PHY/QHP: CPT | Performed by: OBSTETRICS & GYNECOLOGY

## 2024-09-18 ASSESSMENT — ENCOUNTER SYMPTOMS
LOSS OF SENSATION IN FEET: 0
DEPRESSION: 0
OCCASIONAL FEELINGS OF UNSTEADINESS: 0

## 2024-09-18 NOTE — PATIENT INSTRUCTIONS
PATIENT DISCUSSION SUMMARY:  .  .  Mother and infant seen s/p lingual and maxillary frenectomy   24. Mother has still been feeding at breast and offering 1-1.5 oz of expressed milk via bottle at each feeding.  Feels like infant has been struggling to latch more recently and preferring the bottle at time     Infant weight gain good gaining 24.6 g per day since last lactation visit.     Infant able to sustain latch on both breasts in cross cradle position. Mother reports latch as comfortable and nipples rounded  . Mother with milk blister on left nipple, so some tenderness in that nipple.Infant with intermittent clicking and loss of suctin on left breast.     Suck assessment reveals infant with improved extension, elevation and lateralization. Still lateralizing more to left at this visit. Lateralization is still limited, as is elevation. Cupping remains good. No humping of tongue noted, and infant with wider latch and improved flange of both upper and lower lips. Infant still pulling back and sliding when starting to fatigue.      Milk transfer  is slowly increasin ml compared to 80 ml at last visit.     Infant doing much more active feeding, but still taking some longer pauses. Discussed with mother that suck will improve over time as tongue strengthens and infant learns to use previously unused muscles.  Encouraged as much feeding at the breast as possible.       Reviewed post birth warning signs and safe sleep     Plan:  Mother to continue suck training exercises prior to feeds, and stretching exercises as recommended by frenotomy provider.  Mother to feed infant on demand at least 8-12 times daily, preferably skin to skin.  Mother to use breast massage and compression during feeding, to facilitate milk transfer.      Mother to offer expressed milk  if  infant still hungry.  To continue to pump both breasts after feeds for 10 minutes, until infant more efficient with transfer.     If tongue mobility  remains limited, recommend OT consult.     To follow up with pediatrician  For 2 month visit, and with lactation as desires.     Denies questions     LACTATION PROBLEMS AND STRATEGIES:  Nipple blisters/sores: Apply very warm compress to blister to soften it  Immediately put baby to affected breast after applying compresses  Pay attention to good positioning and attachment  Review comfort measures for sore nipples once blisters are open  Seek treatment from physician to open blisters if compress treatment fails  White or clear blisters may be caused by a plug or skin blocking milk flow  Problems latching baby to breast: Baby should latch to areola (dark area) not just the nipple.  Baby's lips should be flanged outward.  Bring the baby up to the level of your breast by putting a pillow under the baby.  Encourage a deeper latch with the asymetrical latch- lining baby's nose opposite mother's nipple.  Gently tickle your baby's lip with your nipple to encourage your baby to open his/her mouth wide.  Hold baby so that your breast is positioned deep in the baby's mouth.  Hold your baby close, tummy to tummy.  If baby does not latch to breast, express breast milk.  If the baby is not latched on well, break seal and gently try again.  Keep baby skin to skin and watch for feeding cues.  Massage breast to start milk-ejection reflex.  Place nipple and at least 1 inch of areola into baby's mouth.  Place your hand behind the baby's neck and shoulder.  Do not force baby's head into breast.  Put baby in the football hold or clutch hold, supporting his/her neck and head in sniffing position to open his/her throat.  Shape breast into oval shape (sandwich hold)  for deep latch.  Try different feeding positions (cradle, football, side etc.).  Use a breast feeding pillow to bring baby up to the level of the breast.  Use semi-reclined feeding position to allow baby to take an active role and trigger baby's inborn feeding behavior.  Use  "your hand to support your breast during feeding.  When your baby's mouth is wide open, quickly pull baby into your breast.  Your baby's chin should be pressed into your breast.  Pumping pointers: Air dry nipples, express milk and rub in or use an approved nipple cream after expressing., Apply heat to breasts before pumping., Choose a familiar comfortable place to express milk., If nipples are sore use less pressure and pump more frequently for shorter times., Listen to a tape of baby while pumping., Look at a photo of baby wile expressing., Massage breasts before and during pumping., Minimize distractions., Moisten flange before beginning to improve seal., Relax for 5 minutes before pumping., Stimulate the nipples and hand express a few drops before pumping., and Use pumping bra/band for \"hands free\" pumping.  PATIENT INSTRUCTION HANDOUTS GIVEN:   None given  LACTATION EDUCATION:  Correct Positioning and Correct Latch On       "

## 2024-09-18 NOTE — PROGRESS NOTES
Lactation Counseling Note    Subjective:    Marisa Hopkins is a 38 y.o. patient referred for lactation counseling. She is here today due to Breast pumping concerns/issues, Latch issues, and sp lingual and maxillary frenectomy 24 . She was referred by her  self .     OB HISTORY:   Healthcare Provider: [facility]  [place] Information: Baby's Name: Violette Huff  : 24  MRN: [id number]  Healthcare Provider: Rose  Birth weight: 3789 gm    Objective:    BREASTFEEDING ASSESSMENT:   Breast Assessment: Medium, Symmetrical, Soft, and Compressible  Nipple Assessment/Stage: intact, erect, rounded after feeding, and milk blister left nipple Stage I - Pain or irritation with no skin breakdown  Areola: Normal  Feeding Position: baby - led, cross - cradle, skin to skin, both sides, and mother demonstrates good positioning  Latch: minimal assistance is needed, instructed on deep latch, eagerly grasped on to latch, deep latch obtained, optimal angle of mouth opening, comfortable with no pain, sucking and swallowing, sucks with long jaw movement, chin and lower lip contact breast first, bursts of sucking, swallowing, and rest, flanged lips, chin moves in rhythmic motion, correct tongue position, and frequent audible swallows  Suck/Feeding: sustained, coordinated suck/swallow/breathe, baby led rhythmically, audible swallowing, and content after feeding  Alternative Feeding Methods: nursing at the breast and feeding expressed breast milk  Feeding and Cleaning instructions reviewed for: Paced bottle  Infant Feeding Method: Breast milk only  Infant Behavior: awake, readiness to feed, feeding cues observed, rooting response, and sucking  Infant Information: Ankyloglossia  Post status frenotomy  Palate- high/arch/bubble/normal  Tongue protrudes over alveolar ridge  Good cupping of tongue  Good lateral movement of the tongue  Fontanel: flat  Mucous Membranes: moist  No other concerns  Breast Pain: Pain scale 0-10 (10 most  pain): 0  Nipple Pain: Pain scale 0-10 (10 most pain): 2  Pain description: pinching  Before feeding pain 0-10 (10 most pain): 0  After adjustment pain 0-10 (10 most pain): 0  Other pain relief methods: Mother's promise balm, silverettes  Nothing else  Weight: Reported pediatrician visit weight: 8 lb 3 oz  Date of pediatrician weight: 24  Weight before feedin gm  Weight after feedin gm  Amount of breast milk transferred: 104 ml  Number of voids in the last 24 hours: 6-8  Number of stools in the last 24 hours: 6-8  No other concerns        PATIENT DISCUSSION SUMMARY:  .  .  Mother and infant seen s/p lingual and maxillary frenectomy   24. Mother has still been feeding at breast and offering 1-1.5 oz of expressed milk via bottle at each feeding.  Feels like infant has been struggling to latch more recently and preferring the bottle at time     Infant weight gain good gaining 24.6 g per day since last lactation visit.     Infant able to sustain latch on both breasts in cross cradle position. Mother reports latch as comfortable and nipples rounded  . Mother with milk blister on left nipple, so some tenderness in that nipple.Infant with intermittent clicking and loss of suctin on left breast.     Suck assessment reveals infant with improved extension, elevation and lateralization. Still lateralizing more to left at this visit. Lateralization is still limited, as is elevation. Cupping remains good. No humping of tongue noted, and infant with wider latch and improved flange of both upper and lower lips. Infant still pulling back and sliding when starting to fatigue.      Milk transfer  is slowly increasin ml compared to 80 ml at last visit.     Infant doing much more active feeding, but still taking some longer pauses. Discussed with mother that suck will improve over time as tongue strengthens and infant learns to use previously unused muscles.  Encouraged as much feeding at the breast as  possible.       Reviewed post birth warning signs and safe sleep     Plan:  Mother to continue suck training exercises prior to feeds, and stretching exercises as recommended by frenotomy provider.  Mother to feed infant on demand at least 8-12 times daily, preferably skin to skin.  Mother to use breast massage and compression during feeding, to facilitate milk transfer.      Mother to offer expressed milk  if  infant still hungry.  To continue to pump both breasts after feeds for 10 minutes, until infant more efficient with transfer.     If tongue mobility remains limited, recommend OT consult.     To follow up with pediatrician  For 2 month visit, and with lactation as desires.     Denies questions     LACTATION PROBLEMS AND STRATEGIES:  Nipple blisters/sores: Apply very warm compress to blister to soften it  Immediately put baby to affected breast after applying compresses  Pay attention to good positioning and attachment  Review comfort measures for sore nipples once blisters are open  Seek treatment from physician to open blisters if compress treatment fails  White or clear blisters may be caused by a plug or skin blocking milk flow  Problems latching baby to breast: Baby should latch to areola (dark area) not just the nipple.  Baby's lips should be flanged outward.  Bring the baby up to the level of your breast by putting a pillow under the baby.  Encourage a deeper latch with the asymetrical latch- lining baby's nose opposite mother's nipple.  Gently tickle your baby's lip with your nipple to encourage your baby to open his/her mouth wide.  Hold baby so that your breast is positioned deep in the baby's mouth.  Hold your baby close, tummy to tummy.  If baby does not latch to breast, express breast milk.  If the baby is not latched on well, break seal and gently try again.  Keep baby skin to skin and watch for feeding cues.  Massage breast to start milk-ejection reflex.  Place nipple and at least 1 inch of  "areola into baby's mouth.  Place your hand behind the baby's neck and shoulder.  Do not force baby's head into breast.  Put baby in the football hold or clutch hold, supporting his/her neck and head in sniffing position to open his/her throat.  Shape breast into oval shape (sandwich hold)  for deep latch.  Try different feeding positions (cradle, football, side etc.).  Use a breast feeding pillow to bring baby up to the level of the breast.  Use semi-reclined feeding position to allow baby to take an active role and trigger baby's inborn feeding behavior.  Use your hand to support your breast during feeding.  When your baby's mouth is wide open, quickly pull baby into your breast.  Your baby's chin should be pressed into your breast.  Pumping pointers: Air dry nipples, express milk and rub in or use an approved nipple cream after expressing., Apply heat to breasts before pumping., Choose a familiar comfortable place to express milk., If nipples are sore use less pressure and pump more frequently for shorter times., Listen to a tape of baby while pumping., Look at a photo of baby wile expressing., Massage breasts before and during pumping., Minimize distractions., Moisten flange before beginning to improve seal., Relax for 5 minutes before pumping., Stimulate the nipples and hand express a few drops before pumping., and Use pumping bra/band for \"hands free\" pumping.  PATIENT INSTRUCTION HANDOUTS GIVEN:   None given  LACTATION EDUCATION:  Correct Positioning and Correct Latch On       "

## 2024-09-20 ENCOUNTER — TELEPHONE (OUTPATIENT)
Dept: LACTATION | Facility: CLINIC | Age: 39
End: 2024-09-20
Payer: COMMERCIAL

## 2024-09-20 NOTE — LACTATION NOTE
Follow up phone call for lactation visit. Feels like some feeds are better and some are worse. Plans to have infant seen by Dr. Farias. Denies need for consult at this time.  Discussed OT consult if tongue mobility does not improve.

## 2024-09-30 DIAGNOSIS — E03.9 HYPOTHYROIDISM IN PREGNANCY, ANTEPARTUM (HHS-HCC): ICD-10-CM

## 2024-09-30 DIAGNOSIS — O99.280 HYPOTHYROIDISM IN PREGNANCY, ANTEPARTUM (HHS-HCC): ICD-10-CM

## 2024-10-01 DIAGNOSIS — O99.280 HYPOTHYROIDISM IN PREGNANCY, ANTEPARTUM (HHS-HCC): ICD-10-CM

## 2024-10-01 DIAGNOSIS — E03.9 HYPOTHYROIDISM IN PREGNANCY, ANTEPARTUM (HHS-HCC): ICD-10-CM

## 2024-10-01 RX ORDER — LEVOTHYROXINE SODIUM 150 UG/1
150 TABLET ORAL
Qty: 30 TABLET | Refills: 1 | Status: SHIPPED | OUTPATIENT
Start: 2024-10-01 | End: 2024-10-02

## 2024-10-01 NOTE — TELEPHONE ENCOUNTER
Called patient to discuss symptoms she is experiencing  Identified by name and   Patient states she has had lump in breast for over a week now, has not gone away  Does not believe that this is a clogged duct or mastitis, is not painful, red, or swollen, no s/s of infection  States it is hard and does not move around  Discussed that it could possibly be a galactocele or probably something benign, and she could wait a week or so to see if this clears up or she can schedule appointment for evaluation  Patient elected to schedule appointment   Scheduled for  at 11:00 with Dr. Avendano at [place] location  Patient verbalized understanding, all questions/concerns addressed at this time.    ANGEL GomezN RN    
Marisa Hopkins called in stating that she was breast feeding and found a lump in her right breast that's a little concerning. Please advise on next steps for patient.    Malini Mcdonald MA    
English

## 2024-10-02 RX ORDER — LEVOTHYROXINE SODIUM 150 UG/1
150 TABLET ORAL
Qty: 90 TABLET | Refills: 1 | Status: SHIPPED | OUTPATIENT
Start: 2024-10-02

## 2024-10-04 ENCOUNTER — APPOINTMENT (OUTPATIENT)
Dept: OBSTETRICS AND GYNECOLOGY | Facility: CLINIC | Age: 39
End: 2024-10-04
Payer: COMMERCIAL

## 2024-10-08 ENCOUNTER — OFFICE VISIT (OUTPATIENT)
Dept: OBSTETRICS AND GYNECOLOGY | Facility: CLINIC | Age: 39
End: 2024-10-08
Payer: COMMERCIAL

## 2024-10-08 VITALS
HEART RATE: 77 BPM | BODY MASS INDEX: 20.95 KG/M2 | WEIGHT: 122.7 LBS | HEIGHT: 64 IN | DIASTOLIC BLOOD PRESSURE: 80 MMHG | SYSTOLIC BLOOD PRESSURE: 128 MMHG

## 2024-10-08 DIAGNOSIS — N63.41 SUBAREOLAR MASS OF RIGHT BREAST: Primary | ICD-10-CM

## 2024-10-08 PROCEDURE — 1036F TOBACCO NON-USER: CPT

## 2024-10-08 PROCEDURE — 3008F BODY MASS INDEX DOCD: CPT

## 2024-10-08 PROCEDURE — 99213 OFFICE O/P EST LOW 20 MIN: CPT

## 2024-10-08 PROCEDURE — 99213 OFFICE O/P EST LOW 20 MIN: CPT | Mod: GC

## 2024-10-08 ASSESSMENT — PATIENT HEALTH QUESTIONNAIRE - PHQ9
2. FEELING DOWN, DEPRESSED OR HOPELESS: NOT AT ALL
SUM OF ALL RESPONSES TO PHQ9 QUESTIONS 1 AND 2: 0
1. LITTLE INTEREST OR PLEASURE IN DOING THINGS: NOT AT ALL

## 2024-10-08 ASSESSMENT — ENCOUNTER SYMPTOMS
ENDOCRINE NEGATIVE: 0
ALLERGIC/IMMUNOLOGIC NEGATIVE: 0
HEMATOLOGIC/LYMPHATIC NEGATIVE: 0
CARDIOVASCULAR NEGATIVE: 0
NEUROLOGICAL NEGATIVE: 0
GASTROINTESTINAL NEGATIVE: 0
MUSCULOSKELETAL NEGATIVE: 0
PSYCHIATRIC NEGATIVE: 0
EYES NEGATIVE: 0
CONSTITUTIONAL NEGATIVE: 0
RESPIRATORY NEGATIVE: 0

## 2024-10-08 ASSESSMENT — PAIN SCALES - GENERAL: PAINLEVEL: 0-NO PAIN

## 2024-10-08 NOTE — PROGRESS NOTES
Subjective   Patient ID: Marisa Hopkins is a 39 y.o. female who presents for Breast Problem (Pt. Is here for lump behind right nipple. Pt. Stated she is breast feeding./0 falls/0 pain /Flu vacc offered Pt. Declined/Last Pap- 24/No LMP ).  HPI    39 y.o.  s/p  on 24 c/b SD.    Noticed small lump behind nipple several weeks ago. Denies breast pain, nipple discharge, fevers, chills. No contralateral symptoms. Still able to breastfeed without any issues. Breastfeeds 6-8x per day. Pumping as well. Breast lump doesn't change size throughout day.    Breastfeeding. On vaginal estrogen for atrophy associated with breastfeeding, started .   No PPBC, natural family planning.    Denies fam hx of breast cancer.     Review of Systems   All other systems reviewed and are negative.      Objective   Physical Exam  Constitutional: No visible distress, alert and cooperative  Respiratory/Thorax: Normal respiratory effort on RA  Breast exam: Pea-sized mobile well-circumscribed mass at 11:00 right breast in periareolar area. No overlying skin changes. No other palpable masses in either breast, no skin changes or nipple discharge.  Cardiovascular: Reg rate  Gastrointestinal: soft, nondistended, nontender  Neurological: A&Ox3  Psychological: Appropriate mood and behavior      Assessment/Plan   Problem List Items Addressed This Visit             ICD-10-CM    Subareolar mass of right breast - Primary N63.41     - History and exam are overall most consistent with clogged duct given that pt is breastfeeding, lump is small, mobile, well-circumscribed. Recommend continued breastfeeding and supportive measures to help clear obstruction.  - Will order breast US to complete in 4 weeks if the lump does not resolve with these interventions. Suspicion for neoplasm at this time is very low given reassuring exam.           Relevant Orders    BI US breast complete right          Seen and d/w Dr. Alejandro Joseph MD  10/08/24 2:36 PM

## 2024-10-08 NOTE — ASSESSMENT & PLAN NOTE
- History and exam are overall most consistent with clogged duct given that pt is breastfeeding, lump is small, mobile, well-circumscribed. Recommend continued breastfeeding and supportive measures to help clear obstruction.  - Will order breast US to complete in 4 weeks if the lump does not resolve with these interventions. Suspicion for neoplasm at this time is very low given reassuring exam.

## 2024-10-21 ENCOUNTER — LAB (OUTPATIENT)
Dept: LAB | Facility: LAB | Age: 39
End: 2024-10-21
Payer: COMMERCIAL

## 2024-10-21 ENCOUNTER — APPOINTMENT (OUTPATIENT)
Dept: PRIMARY CARE | Facility: CLINIC | Age: 39
End: 2024-10-21
Payer: COMMERCIAL

## 2024-10-21 DIAGNOSIS — E06.3 HYPOTHYROIDISM DUE TO HASHIMOTO'S THYROIDITIS: Primary | ICD-10-CM

## 2024-10-21 DIAGNOSIS — E06.3 HYPOTHYROIDISM DUE TO HASHIMOTO'S THYROIDITIS: ICD-10-CM

## 2024-10-21 PROCEDURE — 36415 COLL VENOUS BLD VENIPUNCTURE: CPT

## 2024-10-21 PROCEDURE — 99213 OFFICE O/P EST LOW 20 MIN: CPT | Performed by: STUDENT IN AN ORGANIZED HEALTH CARE EDUCATION/TRAINING PROGRAM

## 2024-10-21 PROCEDURE — 84443 ASSAY THYROID STIM HORMONE: CPT

## 2024-10-21 NOTE — PROGRESS NOTES
Subjective   Patient ID: Marisa Hopkins is a 39 y.o. female who presents for follow up on thyroid   HPI  Marisa delivered her baby and her to continue care for her thyroid  No other concerns endorsed     Past Medical History:   Diagnosis Date    Abnormal Pap smear of cervix     Celiac disease ([facility])     Celiac disease    Contact with and (suspected) exposure to infections with a predominantly sexual mode of transmission 01/10/2019    STD exposure    Disease of thyroid gland     Encounter for removal and reinsertion of intrauterine contraceptive device 2021    Encounter for IUD removal and reinsertion    Encounter for routine checking of intrauterine contraceptive device 2021    IUD check up    Hypothyroidism 2023    Missed  ([facility]) 2023    Multiple nevi 2023    Patellofemoral dysfunction, left 2023    Personal history of other endocrine, nutritional and metabolic disease     History of hypothyroidism    Recurrent pregnancy loss, antepartum condition or complication ([facility])       Past Surgical History:   Procedure Laterality Date    CERVICAL BIOPSY  W/ LOOP ELECTRODE EXCISION  03/10/2014    Cervical Loop Electrosurgical Excision (LEEP)      Family History   Problem Relation Name Age of Onset    Hypothyroidism Mother      Hypertension Mother      Celiac disease Sister          microscopic colitis    Breast cancer Father's Sister      Thyroid disease Other        No Known Allergies       Occupation:     Review of Systems   Constitutional:  Negative for activity change and fever.   HENT:  Negative for congestion.    Respiratory:  Negative for cough, shortness of breath and wheezing.    Cardiovascular:  Negative for chest pain and leg swelling.   Gastrointestinal:  Negative for abdominal pain, constipation, nausea and vomiting.   Endocrine: Negative for cold intolerance.   Genitourinary:  Negative for dysuria, hematuria and urgency.   Neurological:  Negative for  dizziness, speech difficulty, weakness and numbness.   Psychiatric/Behavioral:  Negative for self-injury and suicidal ideas.        Objective   Visit Vitals  LMP 11/02/2023 (Exact Date)   OB Status Recent pregnancy   Smoking Status Never      Physical Exam  Constitutional:       Appearance: Normal appearance.   HENT:      Head: Normocephalic and atraumatic.      Nose: Nose normal.      Mouth/Throat:      Mouth: Mucous membranes are moist.   Eyes:      Conjunctiva/sclera: Conjunctivae normal.      Pupils: Pupils are equal, round, and reactive to light.   Cardiovascular:      Rate and Rhythm: Normal rate and regular rhythm.      Pulses: Normal pulses.      Heart sounds: Normal heart sounds.   Pulmonary:      Effort: Pulmonary effort is normal.      Breath sounds: Normal breath sounds.   Musculoskeletal:         General: Normal range of motion.      Cervical back: Neck supple.   Skin:     General: Skin is warm.   Neurological:      General: No focal deficit present.      Mental Status: She is alert and oriented to person, place, and time.   Psychiatric:         Mood and Affect: Mood normal.         Behavior: Behavior normal.         Thought Content: Thought content normal.         Judgment: Judgment normal.         Assessment/Plan   Diagnoses and all orders for this visit:  Hypothyroidism due to Hashimoto's thyroiditis  -     TSH; Future       Levo thy 125 currently

## 2024-10-22 DIAGNOSIS — E06.3 HYPOTHYROIDISM DUE TO HASHIMOTO'S THYROIDITIS: Primary | ICD-10-CM

## 2024-10-22 DIAGNOSIS — E06.3 HYPOTHYROIDISM DUE TO HASHIMOTO'S THYROIDITIS: ICD-10-CM

## 2024-10-22 LAB — TSH SERPL-ACNC: 0.02 MIU/L (ref 0.44–3.98)

## 2024-10-22 RX ORDER — LEVOTHYROXINE SODIUM 112 UG/1
112 TABLET ORAL DAILY
Qty: 30 TABLET | Refills: 0 | Status: SHIPPED | OUTPATIENT
Start: 2024-10-22 | End: 2024-10-22 | Stop reason: SDUPTHER

## 2024-10-22 RX ORDER — LEVOTHYROXINE SODIUM 112 UG/1
112 TABLET ORAL DAILY
Qty: 30 TABLET | Refills: 1 | Status: SHIPPED | OUTPATIENT
Start: 2024-10-22 | End: 2024-12-21

## 2024-11-01 ENCOUNTER — HOSPITAL ENCOUNTER (OUTPATIENT)
Dept: RADIOLOGY | Facility: CLINIC | Age: 39
Discharge: HOME | End: 2024-11-01
Payer: COMMERCIAL

## 2024-11-01 ENCOUNTER — TELEPHONE (OUTPATIENT)
Dept: OBSTETRICS AND GYNECOLOGY | Facility: CLINIC | Age: 39
End: 2024-11-01

## 2024-11-01 VITALS — HEIGHT: 64 IN | BODY MASS INDEX: 20.49 KG/M2 | WEIGHT: 120 LBS

## 2024-11-01 DIAGNOSIS — N63.41 SUBAREOLAR MASS OF RIGHT BREAST: Primary | ICD-10-CM

## 2024-11-01 DIAGNOSIS — N63.41 SUBAREOLAR MASS OF RIGHT BREAST: ICD-10-CM

## 2024-11-01 PROCEDURE — 76642 ULTRASOUND BREAST LIMITED: CPT | Mod: RT

## 2024-11-01 PROCEDURE — 76982 USE 1ST TARGET LESION: CPT | Mod: RT

## 2024-11-01 PROCEDURE — 77062 BREAST TOMOSYNTHESIS BI: CPT

## 2024-11-01 NOTE — TELEPHONE ENCOUNTER
Received called from Vinicius Francois from [facility] requesting a Diagnostic Bilateral Mammogram order put in ( EXPEDITED) for this Patient.Please send request to Dr.Cecilia Joseph.        Fouzia Youssef CNA

## 2024-11-09 ASSESSMENT — ENCOUNTER SYMPTOMS
SHORTNESS OF BREATH: 0
COUGH: 0
ACTIVITY CHANGE: 0
HEMATURIA: 0
DYSURIA: 0
WHEEZING: 0
DIZZINESS: 0
ABDOMINAL PAIN: 0
FEVER: 0
SPEECH DIFFICULTY: 0
NUMBNESS: 0
VOMITING: 0
NAUSEA: 0
WEAKNESS: 0
CONSTIPATION: 0

## 2024-12-07 DIAGNOSIS — E06.3 HYPOTHYROIDISM DUE TO HASHIMOTO'S THYROIDITIS: ICD-10-CM

## 2024-12-09 DIAGNOSIS — E06.3 HYPOTHYROIDISM DUE TO HASHIMOTO'S THYROIDITIS: ICD-10-CM

## 2024-12-09 RX ORDER — LEVOTHYROXINE SODIUM 112 UG/1
112 TABLET ORAL DAILY
Qty: 90 TABLET | Refills: 0 | Status: SHIPPED | OUTPATIENT
Start: 2024-12-09 | End: 2025-03-09

## 2024-12-09 RX ORDER — LEVOTHYROXINE SODIUM 112 UG/1
112 TABLET ORAL DAILY
Qty: 90 TABLET | Refills: 0 | Status: SHIPPED | OUTPATIENT
Start: 2024-12-09 | End: 2024-12-09 | Stop reason: SDUPTHER

## 2024-12-30 ENCOUNTER — TELEPHONE (OUTPATIENT)
Dept: OBSTETRICS AND GYNECOLOGY | Facility: CLINIC | Age: 39
End: 2024-12-30
Payer: COMMERCIAL

## 2024-12-30 NOTE — TELEPHONE ENCOUNTER
Called patient 12/30/24 to get appointment for 1/8/25 rescheduled for different time or date.    Fouzia Youssef CNA

## 2025-01-02 ENCOUNTER — LAB (OUTPATIENT)
Dept: LAB | Facility: LAB | Age: 40
End: 2025-01-02
Payer: COMMERCIAL

## 2025-01-02 DIAGNOSIS — E06.3 HYPOTHYROIDISM DUE TO HASHIMOTO'S THYROIDITIS: ICD-10-CM

## 2025-01-02 LAB — TSH SERPL-ACNC: 0.42 MIU/L (ref 0.44–3.98)

## 2025-01-02 PROCEDURE — 84443 ASSAY THYROID STIM HORMONE: CPT

## 2025-01-08 ENCOUNTER — APPOINTMENT (OUTPATIENT)
Dept: OBSTETRICS AND GYNECOLOGY | Facility: CLINIC | Age: 40
End: 2025-01-08
Payer: COMMERCIAL

## 2025-02-11 NOTE — PROGRESS NOTES
"Subjective    Marisa Hopkins is a 39 y.o. female who is here for a routine exam. Still breastfeeding, amenorrheic.      Current contraception: None  Last pap: 1/2024 Negative, negative HPV  Last mammogram: n/a    Review of Systems  Constitutional: no fever, no chills, no recent weight gain, no recent weight loss and no fatigue.   Eyes: no eye pain, no vision problems and no dryness of the eyes.   ENT: no hearing loss, no nosebleeds and no sinus congestion.   Cardiovascular: no chest pain, no palpitations and no orthopnea.   Respiratory: no shortness of breath, no cough and no wheezing.   Gastrointestinal: no abdominal pain, no constipation, no nausea, no diarrhea and no vomiting.   Genitourinary: no dysuria, no urinary incontinence, no vaginal dryness, no vaginal itching, no dyspareunia, no pelvic pain, no dysmenorrhea, no sexual problems, no change in urinary frequency, no vaginal discharge, no unexplained vaginal bleeding and no lesion/sore.   Musculoskeletal: no back pain, no joint swelling and no leg edema.   Integumentary: no rashes, no skin lesions, no nipple discharge, no breast pain and no breast lump.   Neurological: no headache, no numbness and no dizziness.   Psychiatric: no sleep disturbances, no anxiety and no depression.   Endocrine: no hot flashes, no loss of hair and no hirsutism.   Hematologic/Lymphatic: no swollen glands, no tendency for easy bleeding and no tendency for easy bruising.          Objective   Ht 1.626 m (5' 4\")   Wt 51.3 kg (113 lb)   Breastfeeding Yes   BMI 19.40 kg/m²        General:   Alert and oriented, in no acute distress   Neck: Supple. No visible thyromegaly.    Breast/Axilla: Left:  Normal to palpation without masses, skin changes, or nipple discharge.  Right:  small <1cm mass within areola, nontender, mobile, symmetrical   Abdomen: Soft, non-tender, without masses or organomegaly   Vulva: Normal architecture without erythema, masses, or lesions.    Vagina: Normal mucosa " without lesions, masses, or atrophy. No abnormal vaginal discharge.    Cervix: Normal without masses, lesions, or signs of cervicitis.    Uterus: Normal mobile, non-enlarged uterus    Adnexa: Normal without masses or lesions   Pelvic Floor No POP noted. No high tone pelvic floor    Psych Normal affect. Normal mood.        Assessment/Plan   Annual exam - discussed diet, exercise, self breast awareness, mammogram and pap guidelines.

## 2025-02-12 ENCOUNTER — APPOINTMENT (OUTPATIENT)
Dept: OBSTETRICS AND GYNECOLOGY | Facility: CLINIC | Age: 40
End: 2025-02-12
Payer: COMMERCIAL

## 2025-02-12 VITALS
WEIGHT: 113 LBS | BODY MASS INDEX: 19.29 KG/M2 | DIASTOLIC BLOOD PRESSURE: 74 MMHG | SYSTOLIC BLOOD PRESSURE: 116 MMHG | HEIGHT: 64 IN

## 2025-02-12 DIAGNOSIS — N64.89 GALACTOCELE: ICD-10-CM

## 2025-02-12 DIAGNOSIS — Z01.419 ENCOUNTER FOR ANNUAL ROUTINE GYNECOLOGICAL EXAMINATION: Primary | ICD-10-CM

## 2025-02-12 PROCEDURE — 3008F BODY MASS INDEX DOCD: CPT | Performed by: OBSTETRICS & GYNECOLOGY

## 2025-02-12 PROCEDURE — 1036F TOBACCO NON-USER: CPT | Performed by: OBSTETRICS & GYNECOLOGY

## 2025-02-12 PROCEDURE — 99395 PREV VISIT EST AGE 18-39: CPT | Performed by: OBSTETRICS & GYNECOLOGY

## 2025-02-12 RX ORDER — LEVOTHYROXINE SODIUM 88 UG/1
1 TABLET ORAL
COMMUNITY
Start: 2025-01-15

## 2025-02-12 SDOH — ECONOMIC STABILITY: FOOD INSECURITY: WITHIN THE PAST 12 MONTHS, YOU WORRIED THAT YOUR FOOD WOULD RUN OUT BEFORE YOU GOT MONEY TO BUY MORE.: NEVER TRUE

## 2025-02-12 SDOH — ECONOMIC STABILITY: FOOD INSECURITY: WITHIN THE PAST 12 MONTHS, THE FOOD YOU BOUGHT JUST DIDN'T LAST AND YOU DIDN'T HAVE MONEY TO GET MORE.: NEVER TRUE

## 2025-02-12 SDOH — ECONOMIC STABILITY: TRANSPORTATION INSECURITY
IN THE PAST 12 MONTHS, HAS LACK OF TRANSPORTATION KEPT YOU FROM MEETINGS, WORK, OR FROM GETTING THINGS NEEDED FOR DAILY LIVING?: NO

## 2025-02-12 SDOH — ECONOMIC STABILITY: TRANSPORTATION INSECURITY
IN THE PAST 12 MONTHS, HAS THE LACK OF TRANSPORTATION KEPT YOU FROM MEDICAL APPOINTMENTS OR FROM GETTING MEDICATIONS?: NO

## 2025-02-12 ASSESSMENT — COLUMBIA-SUICIDE SEVERITY RATING SCALE - C-SSRS
1. IN THE PAST MONTH, HAVE YOU WISHED YOU WERE DEAD OR WISHED YOU COULD GO TO SLEEP AND NOT WAKE UP?: NO
6. HAVE YOU EVER DONE ANYTHING, STARTED TO DO ANYTHING, OR PREPARED TO DO ANYTHING TO END YOUR LIFE?: NO
2. HAVE YOU ACTUALLY HAD ANY THOUGHTS OF KILLING YOURSELF?: NO

## 2025-02-12 ASSESSMENT — PATIENT HEALTH QUESTIONNAIRE - PHQ9
1. LITTLE INTEREST OR PLEASURE IN DOING THINGS: NOT AT ALL
SUM OF ALL RESPONSES TO PHQ9 QUESTIONS 1 AND 2: 0
2. FEELING DOWN, DEPRESSED OR HOPELESS: NOT AT ALL

## 2025-05-05 ENCOUNTER — APPOINTMENT (OUTPATIENT)
Dept: RADIOLOGY | Facility: CLINIC | Age: 40
End: 2025-05-05
Payer: COMMERCIAL

## 2025-05-19 ENCOUNTER — HOSPITAL ENCOUNTER (OUTPATIENT)
Dept: RADIOLOGY | Facility: CLINIC | Age: 40
Discharge: HOME | End: 2025-05-19
Payer: COMMERCIAL

## 2025-05-19 DIAGNOSIS — N64.89 GALACTOCELE: ICD-10-CM

## 2025-05-19 PROCEDURE — 76642 ULTRASOUND BREAST LIMITED: CPT | Mod: RIGHT SIDE | Performed by: RADIOLOGY

## 2025-05-19 PROCEDURE — 76982 USE 1ST TARGET LESION: CPT

## 2025-05-19 PROCEDURE — 76642 ULTRASOUND BREAST LIMITED: CPT | Mod: RT
